# Patient Record
Sex: FEMALE | Race: BLACK OR AFRICAN AMERICAN | NOT HISPANIC OR LATINO | Employment: UNEMPLOYED | URBAN - METROPOLITAN AREA
[De-identification: names, ages, dates, MRNs, and addresses within clinical notes are randomized per-mention and may not be internally consistent; named-entity substitution may affect disease eponyms.]

---

## 2024-11-22 DIAGNOSIS — R82.5 POSITIVE URINE DRUG SCREEN: ICD-10-CM

## 2024-11-22 DIAGNOSIS — O16.1 ELEVATED BLOOD PRESSURE AFFECTING PREGNANCY IN FIRST TRIMESTER, ANTEPARTUM: Primary | ICD-10-CM

## 2024-12-01 PROBLEM — O16.9 ELEVATED BLOOD PRESSURE AFFECTING PREGNANCY, ANTEPARTUM: Status: ACTIVE | Noted: 2024-12-01

## 2024-12-01 PROBLEM — E66.01 SEVERE OBESITY DUE TO EXCESS CALORIES AFFECTING PREGNANCY IN FIRST TRIMESTER: Status: ACTIVE | Noted: 2024-12-01

## 2024-12-01 PROBLEM — O99.320 MARIJUANA USE DURING PREGNANCY: Status: ACTIVE | Noted: 2024-12-01

## 2024-12-01 PROBLEM — F12.90 MARIJUANA USE DURING PREGNANCY: Status: ACTIVE | Noted: 2024-12-01

## 2024-12-01 PROBLEM — O99.211 SEVERE OBESITY DUE TO EXCESS CALORIES AFFECTING PREGNANCY IN FIRST TRIMESTER: Status: ACTIVE | Noted: 2024-12-01

## 2024-12-01 NOTE — PROGRESS NOTES
Maternal Fetal Medicine New Consult    Subjective:     Patient ID: [id number]    Chief Complaint: MFM Consult with US (Elevated Blood Pressure, BMI 44, + UDS Cannabinoids./)      HPI: 29 y.o.  female  at 12w4d gestation with Estimated Date of Delivery: 25. by LMP, consistent with early US. She is sent for MFM consultation for elevated BP, elevated BMI, positive UDS.     She had elevated BP reading of 138/94 at her initial OB visit on 24. She denies any known history of hypertension. She had elevated BMI of 44 at initial OB visit. Her UDS on 11/15/24 returned positive of cannabinoids. She reports stopped with discovery of pregnancy.        She denies any personal or family history of aneuploidy or anomalies. She denies any exposure to high fevers, viral rashes, illicit drugs or alcohol in this pregnancy.  She denies any leaking fluid, vaginal bleeding, contractions, decreased fetal movement. Denies headaches, visual disturbances, or epigastric pain.       Pregnancy complications include:  Patient Active Problem List   Diagnosis    Severe obesity due to excess calories affecting pregnancy in first trimester    Pre-existing essential hypertension affecting pregnancy in first trimester    Marijuana use during pregnancy    Leiomyoma of uterus affecting pregnancy in first trimester       History reviewed. No pertinent past medical history.    Past Surgical History:   Procedure Laterality Date    APPENDECTOMY      at the age of 10       Family History   Problem Relation Name Age of Onset    No Known Problems Paternal Grandfather      No Known Problems Paternal Grandmother      No Known Problems Maternal Grandmother      No Known Problems Maternal Grandfather      No Known Problems Father      Hypertension Mother      No Known Problems Brother      Eclampsia Sister         Social History     Socioeconomic History    Marital status: Single   Tobacco Use    Smoking status: Never     Passive exposure: Never     "Smokeless tobacco: Never   Substance and Sexual Activity    Alcohol use: Not Currently    Drug use: Never    Sexual activity: Yes     Partners: Male       Current Outpatient Medications   Medication Sig Dispense Refill    prenatal vit/iron fum/folic ac (PRENATAL 1+1 ORAL) Take by mouth.      aspirin (ECOTRIN) 81 MG EC tablet Take 1 tablet (81 mg total) by mouth 2 (two) times a day. Start after 12 weeks gestation.  At 35 weeks gestation, decrease to one 81mg tablet daily until delivery. 60 tablet 10    labetaloL (NORMODYNE) 100 MG tablet Take 1 tablet (100 mg total) by mouth every 12 (twelve) hours. 60 tablet 4     No current facility-administered medications for this visit.       Review of patient's allergies indicates:  No Known Allergies     Review of Systems   12 point review of systems conducted, negative except as stated in the history of present illness. See HPI for details.      Objective:     Visit Vitals  BP (!) 140/100 (BP Location: Right arm, Patient Position: Sitting)   Pulse 76   Ht 5' 5" (1.651 m)   Wt 123.4 kg (272 lb)   BMI 45.26 kg/m²        Physical Exam  Vitals and nursing note reviewed.   Constitutional:       General: She is not in acute distress.     Appearance: Normal appearance.      Comments: Increased BMI   HENT:      Head: Normocephalic and atraumatic.      Nose: Nose normal. No congestion.      Mouth/Throat:      Pharynx: Oropharynx is clear.   Eyes:      General: No scleral icterus.     Conjunctiva/sclera: Conjunctivae normal.   Cardiovascular:      Rate and Rhythm: Normal rate and regular rhythm.   Pulmonary:      Effort: No respiratory distress.      Breath sounds: Normal breath sounds. No wheezing.   Abdominal:      General: Abdomen is flat.      Palpations: Abdomen is soft.      Tenderness: There is no abdominal tenderness. There is no right CVA tenderness, left CVA tenderness or guarding.      Comments: No CVA tenderness gravid uterus.    Musculoskeletal:         General: Normal " range of motion.      Cervical back: Neck supple.      Right lower leg: No edema.      Left lower leg: No edema.   Skin:     General: Skin is warm.      Findings: No bruising or rash.   Neurological:      General: No focal deficit present.      Mental Status: She is oriented to person, place, and time.      Deep Tendon Reflexes: Reflexes normal.      Comments: Normal reflexes   Psychiatric:         Mood and Affect: Mood normal.         Behavior: Behavior normal.         Thought Content: Thought content normal.         Judgment: Judgment normal.         Assessment/Plan:     29 y.o.  female with IUP at 12w4d    Chronic hypertension  Viable IUP with measurements consistent with established dating on 12/3/2024.      With 2nd elevated blood pressure reading at this gestational age, chronic hypertension is confirmed.    Chronic hypertension complicates up to 2-5% of pregnancies and is associated with significant adverse pregnancy outcomes including  birth, fetal growth restriction, fetal demise, placental abruption, and  delivery. Recent data suggest higher risk of certain congenital anomalies, including, heart defects, hypospadias and esophageal atresia. The incidence of adverse outcomes seen in pregnancies complicated by chronic hypertension is related to the degree and duration of hypertension and to the association of other organ system involvement or damage. Most pregnant women with mild chronic hypertension have uneventful pregnancies with no end-organ involvement. Comparing women who developed superimposed preeclampsia with women who did not, the incidence of  birth was 100% versus 38%, the incidence of small-for-gestational-age (SGA) infants was 78% versus 15%, and the  mortality rate was 48% versus 0%. Besides superimposed preeclampsia or eclampsia, pregnancy complicated by chronic hypertension (especially if severe) may be associated with worsening or malignant hypertension,  "central nervous system hemorrhage, cardiac decompensation, and renal deterioration or failure.    The age of onset, results of previous evaluation, severity and duration of hypertension, and physical examination are important determinants of which clinical tests may be useful. Ideally, a woman with chronic hypertension should be evaluated before pregnancy to ascertain potentially reversible causes and end-organ involvement (eg, heart or kidney). Baseline laboratory tests may include serum creatinine, blood urea nitrogen, electrolytes, CBC and 24-hour urine evaluation for total protein and creatinine clearance. Women who have had hypertension for several years are more likely to have cardiomegaly, ischemic heart disease, renal involvement, and retinopathy. In patients with severe disease over 4 years, or long standing hypertension (typically if over 30 years old), tests which may prove useful in the evaluation of these women include electrocardiography, echocardiography, ophthalmologic examination, and renal ultrasonography.     Women with paroxysmal hypertension, frequent "hypertensive crisis," seizure disorders, or anxiety attacks should be evaluated for pheochromocytoma with measurements of 24-hour urine vanillylmandelic acid, metanephrines, or unconjugated catecholamines. Magnetic resonance imaging after the first trimester or computed tomography may be useful for adrenal tumor localization. Renal artery stenosis appears to be more prevalent in patients with type-2 diabetes and coexistent hypertension. Doppler flow studies or magnetic resonance angiography are helpful to detect renal artery stenosis. Negative results from renal ultrasonography do not exclude renal artery stenosis.     In women with chronic hypertension, it can be difficult to discern worsening hypertension that might occur as a result of physiological changes of pregnancy in the third trimester from the development of preeclampsia. The use of " certain laboratory tests such as serum creatinine, liver functions tests, hematocrit and platelets to compare to baseline values from early in pregnancy might serve to delineate these conditions. Routine screening of women with chronic hypertension with these laboratory tests is not routinely indicated.    I have shared with her the normal natural course of chronic hypertension in pregnancy with improvement early on and likely increasing blood pressure as the pregnancy advances. Based on findings of recent CHAP Study, it is recommended to utilize 140/90 as the threshold for initiation or titration of medical therapy for chronic hypertension in pregnancy, rather than the previously recommended threshold of 160/110. For patients on blood pressure medications at the start of pregnancy, in the absence of mitigating factors or side effects, they can be maintained on their medications, rather than discontinuing them and waiting to initiate treatment for blood pressures in the severe range. Commonly used medications include nifedipine and labetalol.    Vitals:    24 0910 24 0914 24 0936   BP: (!) 149/110 (!) 147/101 (!) 140/100    With this blood pressure, she was advised to follow low salt diet, and start labetalol at 100 mg q.12 hours. She is also to follow a low sodium diet avoiding any excessive weight gain.     Use aspirin as discussed.    She was advised of the higher risk of fetal growth restriction and superimposed preeclampsia with her underlying chronic hypertension. The risk of placental abruption was also discussed. No prevention is available with her reporting any bleeding or cramping. She was also advised to report any headaches, visual problems, epigastric pain.    There is no consensus as to the most appropriate fetal surveillance test(s) or the interval and timing of testing in  with chronic hypertension. Thus, such testing should be individualized and based on clinical judgment  and on severity of disease.    We will plan to do a level 2 scan around 20 weeks understanding the limitations of ultrasound in checking anomalies and follow-up ultrasounds to monitor growth around 24-26 weeks and then every 4 weeks until the end of pregnancy. Recommend fetal testing starting around 32 weeks gestation until the end of pregnancy    With chronic hypertension on medication, BMI over 40,  recommend delivery at 38 weeks' gestation (38-38 6/7th weeks) as optimal balance between prematurity risks and risks of continued pregnancy. Earlier delivery may be needed for worsening maternal or fetal status.      Recommend close followup after delivery with Primary OB as well as PCP for ongoing evaluation/treatment for HTN.        Elevated BMI  Body mass index is 45.26 kg/m².    I discussed with her the risk of first trimester miscarriage, recurrent miscarriages, congenital anomalies, hypertensive disorders, gestational diabetes,  delivery (BMI>35), macrosomia, higher risk of fetal demise in-utero and higher risk of . She was advised of the importance of eating healthy and and limiting weight gain to 11-15 lbs during the pregnancy, as optimal in this situation.  I recommended low calorie, low fat diet avoiding any additional excessive weight gain.  Excess weight gain would be associated with gestational hypertension, gestational diabetes and adverse  outcomes, including fetal demise in utero.    Low dose aspirin as discussed.    Due to the lack of consistent evidence to indicate  and maternal benefit from early diagnosis and treatment of gestational diabetes mellitus (GDM), the American College of Obstetricians and Gynecologists (ACOG) does not recommend routine screening for GDM before 24 weeks of gestation. ACOG continues to recommend screening for pregestational diabetes mellitus before 24 weeks of gestation, preferably at the onset of prenatal care, in patients with risk  factors.    With her increased risk for pregestational diabetes mellitus, recommend screening for pregestational diabetes with hemoglobin A1c, order given. and I recommend 1 hour GCT between 24-28 weeks' gestation.    With elevated BMI, start fetal testing starting around 34 weeks and do  level 2 scan around 20 weeks. She was advised to do fetal kick counts 3 times daily and PRN after 24 weeks, with immediate reporting of decreased fetal movements (<10 movements/hr).    Preoperative antibiotics and thromboprophylaxis with use of pneumatic compression devices is recommend in those with very elevated BMI undergoing  delivery. Thromboprophylaxis should also be used with those on prolonged immobilization.    Discussed the importance of losing weight after this pregnancy, especially before attempting future pregnancy. Breastfeeding may be an important tool in reducing the postpartum weight retention. Fetal risks were discussed with short term risk of fetal/ obesity and long term risk of adolescent component of metabolic syndrome.       Marijuana use in pregnancy  I discussed with her the likely cognitive developmental effect of those drugs that would not be visible by the ultrasound and the importance of long term  follow up.  Marijuana use in pregnancy is associated with long term neurobehavioral adverse outcomes, as well as fetal growth restriction, stillbirth, spontaneous  birth, and  intensive care unit admission. .  I stressed the importance of complete abstinence from drug use in pregnancy. I would recommend doing intermittent drug screens to help with compliance, with risks and benefits of drug testing discussed and with ultrasounds to check fetal growth (with continued drug use).         Leiomyoma in pregnancy  2 uterine fibroids are noted, largest 4.2 cm on 12/3/2024.    I counseled the patient regarding fibroids in pregnancy. She was counseled on the risk for   delivery,  PROM, abnormal presentations, higher risk of  sections, placenta previa, fibroid degeneration, poor fetal growth, oligohydramnios, vaginal bleeding and postpartum hemorrhage and in rare circumstances obstruction of lower uterus/cervix, impeding vaginal delivery. Although it was previously suggested that myomas are associated with spontaneous miscarriages, a recent review from 2017, showed no increased risk of spontaneous miscarriages in an analysis of 20,000 pregnant women with fibroids.Recent data shows potential association with increase stillbirth risk. The size of myomas, number and position behind placenta may be important factors in that risk. There is no prevention available and expectant management needs to be done.     Myomectomy is generally avoided in pregnancy due to concerns regarding pregnancy complications.    If concern for degenerating fibroids, can administer short course of NSAID (48-72hrs of Indocin) prior to 32 weeks gestation.     She was instructed to report any increased pain, cramps, vaginal discharge or bleeding. Emphasized the importance of monitoring fetal kick count activity, and reporting immediately if decreased fetal movement occurs.    Document postpartum hemorrhage risk on admission to hospital, ensure T&S sent, and consider type and crossmatch depending on postpartum hemorrhage risk.      Preeclampsia prophylaxis  With her risk factors for preeclampsia including chronic hypertension , nulliparity, BMI over 30, mother with preeclampsia, sister with preeclampsia,  ancestry, and low socioeconomic status, discussed recommendations for low dose aspirin use to decrease risks for adverse outcomes, including preeclampsia, low birth weight and  delivery. Low-dose aspirin reduced the risk for preeclampsia by 15% in clinical trials and reduced the risk for  birth by 20% and FGR by 20%, and  mortality by around 20%.  After discussing  risks/benefits of its use, it was agreed to start asa 81 mg BID, due to multiple risk factors for preeclampsia. After discussing benefits (more effective than daily) vs potential risks (less data on safety with use at delivery), she agreed to start low dose aspirin twice daily and continue until 34 6/7weeks, then decrease to once daily until delivery.. Also, recommend using in all future pregnancies.    Patient was counseled of the management of chronic hypertension.  With a uncontrolled hypertension at this time, importance of low-salt healthy diet with limiting weight gain as well as started patient on labetalol 100 mg q.12h.  After counseling on aspirin b.i.d. versus daily agreed to use aspirin b.i.d. starting now till around 34 weeks' gestation then decrease to daily after that.  Patient was counseled of the fibroids and expectant management is to be done for that.    Follow up in about 8 weeks (around 1/28/2025) for MFM follow-up level 2 scan (1 week MFM follow-up visit only).     No future appointments.     Patient was evaluated by JONA Santoyo and Dr. Garner.  Final assessment and recommendations as stated above were made by Dr. Garner.    This note was created with the assistance of [employer] voice recognition software. There may be transcription errors as a result of using this technology, however minimal. Effort has been made to ensure accuracy of transcription, but any obvious errors or omissions should be clarified with the author of the document.

## 2024-12-02 ENCOUNTER — TELEPHONE (OUTPATIENT)
Dept: MATERNAL FETAL MEDICINE | Facility: CLINIC | Age: 29
End: 2024-12-02
Payer: MEDICAID

## 2024-12-03 ENCOUNTER — PROCEDURE VISIT (OUTPATIENT)
Dept: MATERNAL FETAL MEDICINE | Facility: CLINIC | Age: 29
End: 2024-12-03
Payer: MEDICAID

## 2024-12-03 ENCOUNTER — OFFICE VISIT (OUTPATIENT)
Dept: MATERNAL FETAL MEDICINE | Facility: CLINIC | Age: 29
End: 2024-12-03
Payer: MEDICAID

## 2024-12-03 VITALS
HEART RATE: 76 BPM | DIASTOLIC BLOOD PRESSURE: 100 MMHG | BODY MASS INDEX: 45.32 KG/M2 | HEIGHT: 65 IN | SYSTOLIC BLOOD PRESSURE: 140 MMHG | WEIGHT: 272 LBS

## 2024-12-03 DIAGNOSIS — R82.5 POSITIVE URINE DRUG SCREEN: ICD-10-CM

## 2024-12-03 DIAGNOSIS — F12.90 MARIJUANA USE DURING PREGNANCY: ICD-10-CM

## 2024-12-03 DIAGNOSIS — O16.1 ELEVATED BLOOD PRESSURE AFFECTING PREGNANCY IN FIRST TRIMESTER, ANTEPARTUM: ICD-10-CM

## 2024-12-03 DIAGNOSIS — O10.011 PRE-EXISTING ESSENTIAL HYPERTENSION AFFECTING PREGNANCY IN FIRST TRIMESTER: ICD-10-CM

## 2024-12-03 DIAGNOSIS — O99.320 MARIJUANA USE DURING PREGNANCY: ICD-10-CM

## 2024-12-03 DIAGNOSIS — D25.9 LEIOMYOMA OF UTERUS AFFECTING PREGNANCY IN FIRST TRIMESTER: ICD-10-CM

## 2024-12-03 DIAGNOSIS — E66.01 SEVERE OBESITY DUE TO EXCESS CALORIES AFFECTING PREGNANCY IN FIRST TRIMESTER: Primary | ICD-10-CM

## 2024-12-03 DIAGNOSIS — O34.11 LEIOMYOMA OF UTERUS AFFECTING PREGNANCY IN FIRST TRIMESTER: ICD-10-CM

## 2024-12-03 DIAGNOSIS — O99.211 SEVERE OBESITY DUE TO EXCESS CALORIES AFFECTING PREGNANCY IN FIRST TRIMESTER: Primary | ICD-10-CM

## 2024-12-03 RX ORDER — ASPIRIN 81 MG/1
81 TABLET ORAL 2 TIMES DAILY
Qty: 60 TABLET | Refills: 10 | Status: SHIPPED | OUTPATIENT
Start: 2024-12-03 | End: 2025-12-03

## 2024-12-03 RX ORDER — LABETALOL 100 MG/1
100 TABLET, FILM COATED ORAL EVERY 12 HOURS
Qty: 60 TABLET | Refills: 4 | Status: SHIPPED | OUTPATIENT
Start: 2024-12-03

## 2024-12-06 NOTE — PROGRESS NOTES
"  Maternal Fetal Medicine Follow Up    Subjective:     Patient ID: [id number]    Chief Complaint: No chief complaint on file.      HPI: Denise Ulloa is a 29 y.o. female  at 13w4d gestation with Estimated Date of Delivery: 25  who is here for follow-up consultation by M.    She has chronic hypertension diagnosed early this pregnancy.  She is on labetalol 100 mg q.12 hours (started 2024).  She is on low-dose aspirin BID.  She had elevated BMI of 44 at initial OB visit. Her UDS on 11/15/24 returned positive of cannabinoids. She reports stopped with discovery of pregnancy.  She had uterine fibroids seen on consult ultrasound.    Patient is here for blood pressure check today.       Interval history since last M visit: None.. She denies any leaking fluid, vaginal bleeding, contractions, decreased fetal movement. Denies headaches, visual disturbances, or epigastric pain.    Pregnancy complications include:   Patient Active Problem List   Diagnosis    BMI over 40 affecting pregnancy in first trimester    Pre-existing essential hypertension affecting pregnancy in first trimester    Marijuana use during pregnancy    Leiomyoma of uterus affecting pregnancy in first trimester       No changes to medical, surgical, family, social, or obstetric history.    Medications:  Current Outpatient Medications   Medication Instructions    aspirin (ECOTRIN) 81 mg, Oral, 2 times daily, Start after 12 weeks gestation.  At 35 weeks gestation, decrease to one 81mg tablet daily until delivery.    labetaloL (NORMODYNE) 100 mg, Oral, Every 12 hours    prenatal vit/iron fum/folic ac (PRENATAL 1+1 ORAL) Take by mouth.       Review of Systems   12 point review of systems conducted, negative except as stated in the history of present illness. See HPI for details.      Objective:     Visit Vitals  BP (!) 150/100 (BP Location: Left arm, Patient Position: Sitting)   Pulse 93   Ht 5' 5" (1.651 m)   Wt 121.6 kg (268 lb)   BMI 44.60 " kg/m²        Physical Exam  Vitals and nursing note reviewed.   Constitutional:       Appearance: Normal appearance.      Comments: Increased BMI   HENT:      Head: Normocephalic and atraumatic.      Nose: Nose normal. No congestion.   Cardiovascular:      Rate and Rhythm: Normal rate.   Pulmonary:      Effort: Pulmonary effort is normal.   Skin:     Findings: No rash.   Neurological:      Mental Status: She is alert and oriented to person, place, and time.   Psychiatric:         Mood and Affect: Mood normal.         Behavior: Behavior normal.         Thought Content: Thought content normal.         Judgment: Judgment normal.         Assessment/Plan:     29 y.o.  female with IUP at 13w4d    Chronic hypertension  Viable IUP with measurements consistent with established dating on 12/3/2024.    FHT present per Doppler today at 163 (12/10/2024).     Chronic hypertension is associated with significant adverse pregnancy outcomes including  birth, fetal growth restriction, fetal demise, placental abruption, and  delivery. Based on findings of recent CHAP Study, it is recommended to utilize 140/90 as the threshold for initiation or titration of medical therapy for chronic hypertension in pregnancy, rather than the previously recommended threshold of 160/110. For patients on blood pressure medications at the start of pregnancy, in the absence of mitigating factors or side effects, they can be maintained on their medications, rather than discontinuing them and waiting to initiate treatment for blood pressures in the severe range.    Vitals:    12/10/24 0913 12/10/24 0917   BP: (!) 147/107 (!) 150/100    With this BP, she was advised to continue low salt diet, and adjust labetalol to 200 mg every 12 hours .   Reminded to do labs as ordered.     Low dose aspirin as discussed.    She would benefit from a level 2 scan around 20 weeks understanding the limitations of ultrasound in checking anomalies and  follow-up ultrasounds to monitor growth around 24-26 weeks and then every 4 weeks until the end of pregnancy. Recommend fetal testing starting around 32 weeks gestation until the end of pregnancy.    Among patients with uncomplicated chronic hypertension with no additional risk factors, delivery at 38-39 weeks appears to provide optimal balance between the risk of adverse fetal and  outcomes. However, will reassess closer to EDC to determine optimal timeframe or GA for delivery, based on current evaluation at that time.     Recommend close postpartum followup with Primary OB as well as PCP for ongoing evaluation/treatment of HTN.        Elevated BMI  Body mass index is 44.6 kg/m². With some weight loss, she was advised to continue healthy, low-sodium diet with a goal of 11-20 lb gain in the pregnancy. Excess weight gain would be associated with gestational hypertension, gestational diabetes and adverse  outcomes, including fetal demise in utero.    Encouraged to do hemoglobin A1c as ordered on consult visit.  If normal,  I recommend 1 hour GCT between 24-28 weeks' gestation.    Fetal surveillance as above.    Preoperative antibiotics and thromboprophylaxis with use of pneumatic compression devices is recommend in those with very elevated BMI undergoing  delivery. Thromboprophylaxis should also be used with those on prolonged immobilization.    It is important to lose weight after the pregnancy is over, especially before a future pregnancy was discussed. Breastfeeding may be an important tool in reducing the postpartum weight retention. Fetal risks were discussed with short term risk of fetal/ obesity and long term risk of adolescent component of metabolic syndrome.        Previous marijuana use  Previously discussed risks.  She reported abstinence and she was encouraged to continue complete abstinence throughout the pregnancy.        Leiomyoma in pregnancy  2 uterine fibroids are  noted, largest 4.2 cm on 12/3/2024.    Risks of fibroids include  delivery,  PROM, abnormal presentations, higher risk of  sections, placenta previa, fibroid degeneration, poor fetal growth, oligohydramnios, vaginal bleeding and postpartum hemorrhage and in rare circumstances obstruction of lower uterus/cervix, impeding vaginal delivery    We will follow fetal growth as above.  If concern for degenerating fibroids, can administer short course of NSAID (48-72hrs of Indocin) prior to 32 weeks gestation.     Reviewed instructions to report any increased pain, cramps, vaginal discharge or bleeding. Emphasized the importance of monitoring fetal kick count activity, and reporting immediately if decreased fetal movement occurs.    Document postpartum hemorrhage risk on admission to hospital, ensure T&S sent, and consider type and crossmatch depending on postpartum hemorrhage risk       Preeclampsia prophylaxis  She was advised to continue asa 81 mg BID until 34 6/7 weeks, then decrease to once daily until delivery. Preeclampsia precautions reviewed.     Blood pressure is still uncontrolled patient has no symptoms.  Patient was advised to double her dose of medication to labetalol 200 mg q.12 hours and she has an appointment in 1 week with Dr. Greenberg that she was advised to keep.  Patient was also advised to have her blood pressure checked at home and to report Dr. Greenberg or us if blood pressures systolic over 155 or diastolic over 105.  She will continue close follow-up with Dr. Greenberg over the next 6 weeks with the 1st follow-up next week and will see her back in January.  Reminded the patient of the importance of continuing the aspirin b.i.d., low-salt diet, and doing the 24 hour urine in the hemoglobin A1c that she still had the order for.  No follow-ups on file.     Future Appointments   Date Time Provider Department Center   2025  8:15 AM ROOM 3, [facility] [place]    1/28/2025  8:30 AM Mo Garner MD [facility] [place]        ADDY involvement: Patient was evaluated and examined by Dr. Garner. JONA Santoyo, helped in pre charting of part of note.    This note was created with the assistance of [employer] voice recognition software. There may be transcription errors as a result of using this technology, however minimal. Effort has been made to ensure accuracy of transcription, but any obvious errors or omissions should be clarified with the author of the document.

## 2024-12-10 ENCOUNTER — OFFICE VISIT (OUTPATIENT)
Dept: MATERNAL FETAL MEDICINE | Facility: CLINIC | Age: 29
End: 2024-12-10
Payer: MEDICAID

## 2024-12-10 VITALS
HEIGHT: 65 IN | BODY MASS INDEX: 44.65 KG/M2 | HEART RATE: 93 BPM | DIASTOLIC BLOOD PRESSURE: 100 MMHG | WEIGHT: 268 LBS | SYSTOLIC BLOOD PRESSURE: 150 MMHG

## 2024-12-10 DIAGNOSIS — E66.01 SEVERE OBESITY DUE TO EXCESS CALORIES AFFECTING PREGNANCY IN FIRST TRIMESTER: ICD-10-CM

## 2024-12-10 DIAGNOSIS — O99.211 SEVERE OBESITY DUE TO EXCESS CALORIES AFFECTING PREGNANCY IN FIRST TRIMESTER: ICD-10-CM

## 2024-12-10 DIAGNOSIS — D25.9 LEIOMYOMA OF UTERUS AFFECTING PREGNANCY IN FIRST TRIMESTER: ICD-10-CM

## 2024-12-10 DIAGNOSIS — O34.11 LEIOMYOMA OF UTERUS AFFECTING PREGNANCY IN FIRST TRIMESTER: ICD-10-CM

## 2024-12-10 DIAGNOSIS — F12.90 MARIJUANA USE DURING PREGNANCY: ICD-10-CM

## 2024-12-10 DIAGNOSIS — O99.320 MARIJUANA USE DURING PREGNANCY: ICD-10-CM

## 2024-12-10 DIAGNOSIS — O10.011 PRE-EXISTING ESSENTIAL HYPERTENSION AFFECTING PREGNANCY IN FIRST TRIMESTER: Primary | ICD-10-CM

## 2024-12-10 PROCEDURE — 3077F SYST BP >= 140 MM HG: CPT | Mod: CPTII,S$GLB,, | Performed by: OBSTETRICS & GYNECOLOGY

## 2024-12-10 PROCEDURE — 1159F MED LIST DOCD IN RCRD: CPT | Mod: CPTII,S$GLB,, | Performed by: OBSTETRICS & GYNECOLOGY

## 2024-12-10 PROCEDURE — 1160F RVW MEDS BY RX/DR IN RCRD: CPT | Mod: CPTII,S$GLB,, | Performed by: OBSTETRICS & GYNECOLOGY

## 2024-12-10 PROCEDURE — 99214 OFFICE O/P EST MOD 30 MIN: CPT | Mod: TH,S$GLB,, | Performed by: OBSTETRICS & GYNECOLOGY

## 2024-12-10 PROCEDURE — 3080F DIAST BP >= 90 MM HG: CPT | Mod: CPTII,S$GLB,, | Performed by: OBSTETRICS & GYNECOLOGY

## 2024-12-10 PROCEDURE — 3008F BODY MASS INDEX DOCD: CPT | Mod: CPTII,S$GLB,, | Performed by: OBSTETRICS & GYNECOLOGY

## 2024-12-10 RX ORDER — LABETALOL 200 MG/1
200 TABLET, FILM COATED ORAL EVERY 12 HOURS
Qty: 60 TABLET | Refills: 5 | Status: SHIPPED | OUTPATIENT
Start: 2024-12-10 | End: 2025-06-08

## 2025-01-16 DIAGNOSIS — O10.011 PRE-EXISTING ESSENTIAL HYPERTENSION AFFECTING PREGNANCY IN FIRST TRIMESTER: Primary | ICD-10-CM

## 2025-01-16 DIAGNOSIS — O99.211 SEVERE OBESITY DUE TO EXCESS CALORIES AFFECTING PREGNANCY IN FIRST TRIMESTER: ICD-10-CM

## 2025-01-16 DIAGNOSIS — Z36.89 ENCOUNTER FOR FETAL ANATOMIC SURVEY: ICD-10-CM

## 2025-01-16 DIAGNOSIS — E66.01 SEVERE OBESITY DUE TO EXCESS CALORIES AFFECTING PREGNANCY IN FIRST TRIMESTER: ICD-10-CM

## 2025-01-16 DIAGNOSIS — D25.9 LEIOMYOMA OF UTERUS AFFECTING PREGNANCY IN FIRST TRIMESTER: ICD-10-CM

## 2025-01-16 DIAGNOSIS — O34.11 LEIOMYOMA OF UTERUS AFFECTING PREGNANCY IN FIRST TRIMESTER: ICD-10-CM

## 2025-01-24 PROBLEM — O10.012 PRE-EXISTING ESSENTIAL HYPERTENSION AFFECTING PREGNANCY IN SECOND TRIMESTER: Status: ACTIVE | Noted: 2024-12-01

## 2025-01-24 PROBLEM — O99.212 SEVERE OBESITY DUE TO EXCESS CALORIES AFFECTING PREGNANCY IN SECOND TRIMESTER: Status: ACTIVE | Noted: 2024-12-01

## 2025-01-24 PROBLEM — O34.12 LEIOMYOMA OF UTERUS AFFECTING PREGNANCY IN SECOND TRIMESTER: Status: ACTIVE | Noted: 2024-12-03

## 2025-01-24 NOTE — PROGRESS NOTES
Maternal Fetal Medicine Follow Up    Subjective:     Patient ID: [id number]    Chief Complaint: MFM Followup      HPI: Denise Ulloa is a 29 y.o. female  at 20w4d gestation with Estimated Date of Delivery: 25  who is here for follow-up consultation by M.    She has chronic hypertension diagnosed early this pregnancy.  She is on labetalol 300 mg q.12 hours.  Baseline labs on 2024:  Serum creatinine 0.61, 24 hour urine 203.7 mg protein.  She is on low-dose aspirin BID.  She had elevated BMI of 44 at initial OB visit.  On 2024, she had hemoglobin A1c 5.2%.  Her UDS on 11/15/24 returned positive of cannabinoids. She reported she stopped with discovery of pregnancy.  She had uterine fibroids seen on consult ultrasound.  Patient was accompanied by DJ her significant other.       Interval history since last [facility] visit: None.. She denies any leaking fluid, vaginal bleeding, contractions, decreased fetal movement. Denies headaches, visual disturbances, or epigastric pain.    Pregnancy complications include:   Patient Active Problem List   Diagnosis    BMI>40 affecting pregnancy in second trimester    Pre-existing essential hypertension affecting pregnancy in second trimester    Marijuana use during pregnancy    Leiomyoma of uterus affecting pregnancy in second trimester    Excessive weight gain in pregnancy in second trimester       No changes to medical, surgical, family, social, or obstetric history.    Medications:  Current Outpatient Medications   Medication Instructions    aspirin (ECOTRIN) 81 mg, Oral, 2 times daily, Start after 12 weeks gestation.  At 35 weeks gestation, decrease to one 81mg tablet daily until delivery.    labetaloL (NORMODYNE) 300 mg, 3 times daily    prenatal vit/iron fum/folic ac (PRENATAL 1+1 ORAL) Take by mouth.       Review of Systems   12 point review of systems conducted, negative except as stated in the history of present illness. See HPI for details.      Objective:  "    Visit Vitals  /88 (BP Location: Right arm, Patient Position: Sitting)   Pulse 77   Ht 5' 5" (1.651 m)   Wt 126.1 kg (277 lb 14.4 oz)   BMI 46.24 kg/m²          Physical Exam  Vitals and nursing note reviewed.   Constitutional:       Appearance: Normal appearance.      Comments: Increased BMI   HENT:      Head: Normocephalic and atraumatic.      Nose: Nose normal. No congestion.   Cardiovascular:      Rate and Rhythm: Normal rate.   Pulmonary:      Effort: Pulmonary effort is normal.   Skin:     Findings: No rash.   Neurological:      Mental Status: She is alert and oriented to person, place, and time.   Psychiatric:         Mood and Affect: Mood normal.         Behavior: Behavior normal.         Thought Content: Thought content normal.         Judgment: Judgment normal.         Assessment/Plan:     29 y.o.  female with IUP at 20w4d    Chronic hypertension  There is normal fetal growth and no anomalies seen on fetal anatomy scan on 25.  AFV is normal.     Chronic hypertension is associated with significant adverse pregnancy outcomes including  birth, fetal growth restriction, fetal demise, placental abruption, and  delivery. Based on findings of recent CHAP Study, it is recommended to utilize 140/90 as the threshold for initiation or titration of medical therapy for chronic hypertension in pregnancy, rather than the previously recommended threshold of 160/110. For patients on blood pressure medications at the start of pregnancy, in the absence of mitigating factors or side effects, they can be maintained on their medications, rather than discontinuing them and waiting to initiate treatment for blood pressures in the severe range.    Vitals:    25 0856 25 0858   BP: (!) 127/90 134/88      With this BP, she was advised to continue low salt diet, and continue labetalol 200 mg every 12 hours.     Low dose aspirin as discussed.    She would benefit from follow-up ultrasounds " to monitor growth every 4 weeks until the end of pregnancy. Recommend fetal testing starting around 32 weeks gestation until the end of pregnancy.    Among patients with uncomplicated chronic hypertension with no additional risk factors, delivery at 38-39 weeks appears to provide optimal balance between the risk of adverse fetal and  outcomes. However, will reassess closer to EDC to determine optimal timeframe or GA for delivery, based on current evaluation at that time.     Recommend close postpartum followup with Primary OB as well as PCP for ongoing evaluation/treatment of HTN.        Elevated BMI with 9 lb weight gain in the last 6 weeks  Body mass index is 46.24 kg/m². With some weight loss, earlier and 9 lb weight gain in the last 6 weeks.  she was advised to continue healthy, low-sodium diet with a goal of 11-20 lb gain in the pregnancy. Excess weight gain would be associated with gestational hypertension, gestational diabetes and adverse  outcomes, including fetal demise in utero.    I recommend 1 hour GCT between 24-28 weeks' gestation.    Fetal surveillance as above.    Preoperative antibiotics and thromboprophylaxis with use of pneumatic compression devices is recommend in those with very elevated BMI undergoing  delivery. Thromboprophylaxis should also be used with those on prolonged immobilization.    It is important to lose weight after the pregnancy is over, especially before a future pregnancy was discussed. Breastfeeding may be an important tool in reducing the postpartum weight retention. Fetal risks were discussed with short term risk of fetal/ obesity and long term risk of adolescent component of metabolic syndrome.        Previous marijuana use  Previously discussed risks.  She reported abstinence and she was encouraged to continue complete abstinence throughout the pregnancy.        Leiomyoma in pregnancy  2 uterine fibroids are noted, largest 5.7 cm on 25,  slightly larger than previous assessment.    Risks of fibroids include  delivery,  PROM, abnormal presentations, higher risk of  sections, placenta previa, fibroid degeneration, poor fetal growth, oligohydramnios, vaginal bleeding and postpartum hemorrhage and in rare circumstances obstruction of lower uterus/cervix, impeding vaginal delivery    We will follow fetal growth as above.  If concern for degenerating fibroids, can administer short course of NSAID (48-72hrs of Indocin) prior to 32 weeks gestation.     Reviewed instructions to report any increased pain, cramps, vaginal discharge or bleeding. Emphasized the importance of monitoring fetal kick count activity, and reporting immediately if decreased fetal movement occurs.    Document postpartum hemorrhage risk on admission to hospital, ensure T&S sent, and consider type and crossmatch depending on postpartum hemorrhage risk       Preeclampsia prophylaxis  She was advised to continue asa 81 mg BID until 34 6/7 weeks, then decrease to once daily until delivery. Preeclampsia precautions   reviewed.       Blood pressure is stable.  No adjustment done on dose of labetalol.  Continue aspirin b.i.d..  Importance of avoiding excess weight gain and decreasing caloric intake with a 9 lb weight gain in the last 6 weeks.  Fibroid is slightly larger then before.  Expectant management.  We will plan to see her back in 4 weeks.    Follow up in about 4 weeks (around 2025) for MFM follow-up, Repeat ultrasound.     Future Appointments   Date Time Provider Department Center   2025  9:00 AM Mo Garner MD [facility] [place]   2025  9:00 AM ROOM 2, [facility] M[place]      ADDY involvement: Patient was evaluated and examined by Dr. Garner. JONA Santoyo, helped in pre charting of part of note.    This note was created with the assistance of [employer] voice recognition software. There may be transcription errors as a result of  using this technology, however minimal. Effort has been made to ensure accuracy of transcription, but any obvious errors or omissions should be clarified with the author of the document.

## 2025-01-28 ENCOUNTER — OFFICE VISIT (OUTPATIENT)
Dept: MATERNAL FETAL MEDICINE | Facility: CLINIC | Age: 30
End: 2025-01-28
Payer: MEDICAID

## 2025-01-28 ENCOUNTER — PROCEDURE VISIT (OUTPATIENT)
Dept: MATERNAL FETAL MEDICINE | Facility: CLINIC | Age: 30
End: 2025-01-28
Payer: MEDICAID

## 2025-01-28 VITALS
HEART RATE: 77 BPM | BODY MASS INDEX: 46.3 KG/M2 | WEIGHT: 277.88 LBS | SYSTOLIC BLOOD PRESSURE: 134 MMHG | HEIGHT: 65 IN | DIASTOLIC BLOOD PRESSURE: 88 MMHG

## 2025-01-28 DIAGNOSIS — Z36.89 ENCOUNTER FOR FETAL ANATOMIC SURVEY: ICD-10-CM

## 2025-01-28 DIAGNOSIS — O34.12 LEIOMYOMA OF UTERUS AFFECTING PREGNANCY IN SECOND TRIMESTER: ICD-10-CM

## 2025-01-28 DIAGNOSIS — E66.01 SEVERE OBESITY DUE TO EXCESS CALORIES AFFECTING PREGNANCY IN FIRST TRIMESTER: ICD-10-CM

## 2025-01-28 DIAGNOSIS — O26.02 EXCESSIVE WEIGHT GAIN IN PREGNANCY IN SECOND TRIMESTER: ICD-10-CM

## 2025-01-28 DIAGNOSIS — O99.212 SEVERE OBESITY DUE TO EXCESS CALORIES AFFECTING PREGNANCY IN SECOND TRIMESTER: ICD-10-CM

## 2025-01-28 DIAGNOSIS — O99.211 SEVERE OBESITY DUE TO EXCESS CALORIES AFFECTING PREGNANCY IN FIRST TRIMESTER: ICD-10-CM

## 2025-01-28 DIAGNOSIS — D25.9 LEIOMYOMA OF UTERUS AFFECTING PREGNANCY IN FIRST TRIMESTER: ICD-10-CM

## 2025-01-28 DIAGNOSIS — O10.012 PRE-EXISTING ESSENTIAL HYPERTENSION AFFECTING PREGNANCY IN SECOND TRIMESTER: Primary | ICD-10-CM

## 2025-01-28 DIAGNOSIS — O34.11 LEIOMYOMA OF UTERUS AFFECTING PREGNANCY IN FIRST TRIMESTER: ICD-10-CM

## 2025-01-28 DIAGNOSIS — O10.011 PRE-EXISTING ESSENTIAL HYPERTENSION AFFECTING PREGNANCY IN FIRST TRIMESTER: ICD-10-CM

## 2025-01-28 DIAGNOSIS — D25.9 LEIOMYOMA OF UTERUS AFFECTING PREGNANCY IN SECOND TRIMESTER: ICD-10-CM

## 2025-01-28 DIAGNOSIS — E66.01 SEVERE OBESITY DUE TO EXCESS CALORIES AFFECTING PREGNANCY IN SECOND TRIMESTER: ICD-10-CM

## 2025-01-28 RX ORDER — LABETALOL 300 MG/1
300 TABLET, FILM COATED ORAL 3 TIMES DAILY
COMMUNITY
Start: 2024-12-30

## 2025-02-12 DIAGNOSIS — Z36.2 ENCOUNTER FOR FOLLOW-UP ULTRASOUND OF FETAL ANATOMY: ICD-10-CM

## 2025-02-12 DIAGNOSIS — O10.012 PRE-EXISTING ESSENTIAL HYPERTENSION AFFECTING PREGNANCY IN SECOND TRIMESTER: Primary | ICD-10-CM

## 2025-02-12 DIAGNOSIS — O99.212 SEVERE OBESITY DUE TO EXCESS CALORIES AFFECTING PREGNANCY IN SECOND TRIMESTER: ICD-10-CM

## 2025-02-12 DIAGNOSIS — O34.12 LEIOMYOMA OF UTERUS AFFECTING PREGNANCY IN SECOND TRIMESTER: ICD-10-CM

## 2025-02-12 DIAGNOSIS — E66.01 SEVERE OBESITY DUE TO EXCESS CALORIES AFFECTING PREGNANCY IN SECOND TRIMESTER: ICD-10-CM

## 2025-02-12 DIAGNOSIS — D25.9 LEIOMYOMA OF UTERUS AFFECTING PREGNANCY IN SECOND TRIMESTER: ICD-10-CM

## 2025-02-21 NOTE — PROGRESS NOTES
Maternal Fetal Medicine Follow Up    Subjective:     Patient ID: [id number]    Chief Complaint: [facility] follow up w/us       HPI: Denise Ulloa is a 29 y.o. female  at 24w4d gestation with Estimated Date of Delivery: 25  who is here for follow-up consultation by M.    She has chronic hypertension diagnosed early this pregnancy.  She is on labetalol 300 mg q.8 hours.  Baseline labs on 2024:  Serum creatinine 0.61, 24 hour urine 203.7 mg protein.  She is on low-dose aspirin BID.  She had elevated BMI of 44 at initial OB visit.  On 2024, she had hemoglobin A1c 5.2%.  Her UDS on 11/15/24 returned positive of cannabinoids. She reported she stopped with discovery of pregnancy.  She has fibroid uterus.       Interval history since last MFM visit: None.. She denies any leaking fluid, vaginal bleeding, contractions, decreased fetal movement. Denies headaches, visual disturbances, or epigastric pain.    Pregnancy complications include:   Patient Active Problem List   Diagnosis    BMI>40 affecting pregnancy in second trimester    Pre-existing essential hypertension affecting pregnancy in second trimester    Marijuana use during pregnancy    Leiomyoma of uterus affecting pregnancy in second trimester    Excessive weight gain in pregnancy in second trimester       No changes to medical, surgical, family, social, or obstetric history.    Medications:  Current Outpatient Medications   Medication Instructions    aspirin (ECOTRIN) 81 mg, Oral, 2 times daily, Start after 12 weeks gestation.  At 35 weeks gestation, decrease to one 81mg tablet daily until delivery.    labetaloL (NORMODYNE) 300 mg, 3 times daily    prenatal vit/iron fum/folic ac (PRENATAL 1+1 ORAL) Take by mouth.       Review of Systems   12 point review of systems conducted, negative except as stated in the history of present illness. See HPI for details.      Objective:     Visit Vitals  /89 (BP Location: Left arm, Patient Position:  "Sitting)   Pulse 84   Ht 5' 5" (1.651 m)   Wt 131.1 kg (289 lb)   BMI 48.09 kg/m²            Physical Exam  Vitals and nursing note reviewed.   Constitutional:       Appearance: Normal appearance.      Comments: Increased BMI   HENT:      Head: Normocephalic and atraumatic.      Nose: Nose normal. No congestion.   Cardiovascular:      Rate and Rhythm: Normal rate.   Pulmonary:      Effort: Pulmonary effort is normal.   Skin:     Findings: No rash.   Neurological:      Mental Status: She is alert and oriented to person, place, and time.   Psychiatric:         Mood and Affect: Mood normal.         Behavior: Behavior normal.         Thought Content: Thought content normal.         Judgment: Judgment normal.         Assessment/Plan:     29 y.o.  female with IUP at 24w4d    Chronic hypertension  There is normal fetal growth with an EFW of 711 g at the 44% and the AC at the 42% on 25.  AFV is normal.      Chronic hypertension is associated with significant adverse pregnancy outcomes including  birth, fetal growth restriction, fetal demise, placental abruption, and  delivery. Based on findings of recent CHAP Study, it is recommended to utilize 140/90 as the threshold for initiation or titration of medical therapy for chronic hypertension in pregnancy, rather than the previously recommended threshold of 160/110. For patients on blood pressure medications at the start of pregnancy, in the absence of mitigating factors or side effects, they can be maintained on their medications, rather than discontinuing them and waiting to initiate treatment for blood pressures in the severe range.    Vitals:    25 0925   BP: 133/89        With this BP, patient was advised to continue taking  labetalol 300 mg every 8  hours.     Low dose aspirin as discussed.    She would benefit from follow-up ultrasounds to monitor growth every 4 weeks until the end of pregnancy. Recommend fetal testing starting around 32 " weeks gestation until the end of pregnancy. Fetal kick count instructions reviewed.     Among patients with uncomplicated chronic hypertension with no additional risk factors, delivery at 38-39 weeks appears to provide optimal balance between the risk of adverse fetal and  outcomes. However, will reassess closer to EDC to determine optimal timeframe or GA for delivery, based on current evaluation at that time.     Recommend close postpartum followup with Primary OB as well as PCP for ongoing evaluation/treatment of HTN.        Elevated BMI continued excessive weight gain  Body mass index is 48.09 kg/m². With excessive gain of 12 lb in about 4 weeks, and 21 lb in about 10 weeks she was advised to decrease caloric intake and reviewed the importance of following healthy, low-sodium diet to avoid any additional excessive weight gain.   Excess weight gain would be associated with worsening hypertension, gestational diabetes and adverse  outcomes, including fetal demise in utero.    I recommend 1 hour GCT between 24-28 weeks' gestation.    Fetal surveillance as above.    Preoperative antibiotics and thromboprophylaxis with use of pneumatic compression devices is recommend in those with very elevated BMI undergoing  delivery. Thromboprophylaxis should also be used with those on prolonged immobilization.    It is important to lose weight after the pregnancy is over, especially before a future pregnancy was discussed. Breastfeeding may be an important tool in reducing the postpartum weight retention. Fetal risks were discussed with short term risk of fetal/ obesity and long term risk of adolescent component of metabolic syndrome.        Previous marijuana use  Previously discussed risks.  She reported abstinence and she was encouraged to continue complete abstinence throughout the pregnancy.        Leiomyoma in pregnancy  Largest mean 6.1 cm on today, 2025      Risks of fibroids include   delivery,  PROM, abnormal presentations, higher risk of  sections, placenta previa, fibroid degeneration, poor fetal growth, oligohydramnios, vaginal bleeding and postpartum hemorrhage and in rare circumstances obstruction of lower uterus/cervix, impeding vaginal delivery    We will follow fetal growth as above.  If concern for degenerating fibroids, can administer short course of NSAID (48-72hrs of Indocin) prior to 32 weeks gestation.     Reviewed instructions to report any increased pain, cramps, vaginal discharge or bleeding. Emphasized the importance of monitoring fetal kick count activity, and reporting immediately if decreased fetal movement occurs.    Document postpartum hemorrhage risk on admission to hospital, ensure T&S sent, and consider type and crossmatch depending on postpartum hemorrhage risk       Preeclampsia prophylaxis  She was advised to continue asa 81 mg BID until 34 6/7 weeks, then decrease to once daily until delivery. Preeclampsia precautions   reviewed.       There is normal fetal growth.  Blood pressure is stable.  Continue aspirin b.i.d. and labetalol 300 mg q.8 hours.  Risks from excessive weight gain reviewed advised her to decrease caloric intake.    Follow up in about 4 weeks (around 3/25/2025) for [facility] follow-up, Repeat ultrasound.     Future Appointments   Date Time Provider Department Center   3/25/2025  9:00 AM Mo Garner MD [facility] [place]   3/25/2025  9:00 AM ROOM 2, [facility] [place]        ADDY involvement: Patient was evaluated and examined by Dr. Garner. JONA Santoyo, helped in pre charting of part of note.    This note was created with the assistance of [employer] voice recognition software. There may be transcription errors as a result of using this technology, however minimal. Effort has been made to ensure accuracy of transcription, but any obvious errors or omissions should be clarified with the author of the document.

## 2025-02-25 ENCOUNTER — PROCEDURE VISIT (OUTPATIENT)
Dept: MATERNAL FETAL MEDICINE | Facility: CLINIC | Age: 30
End: 2025-02-25
Payer: MEDICAID

## 2025-02-25 ENCOUNTER — OFFICE VISIT (OUTPATIENT)
Dept: MATERNAL FETAL MEDICINE | Facility: CLINIC | Age: 30
End: 2025-02-25
Payer: MEDICAID

## 2025-02-25 VITALS
HEART RATE: 84 BPM | WEIGHT: 289 LBS | BODY MASS INDEX: 48.15 KG/M2 | HEIGHT: 65 IN | SYSTOLIC BLOOD PRESSURE: 133 MMHG | DIASTOLIC BLOOD PRESSURE: 89 MMHG

## 2025-02-25 DIAGNOSIS — O26.02 EXCESSIVE WEIGHT GAIN IN PREGNANCY IN SECOND TRIMESTER: ICD-10-CM

## 2025-02-25 DIAGNOSIS — Z36.2 ENCOUNTER FOR FOLLOW-UP ULTRASOUND OF FETAL ANATOMY: ICD-10-CM

## 2025-02-25 DIAGNOSIS — O99.212 SEVERE OBESITY DUE TO EXCESS CALORIES AFFECTING PREGNANCY IN SECOND TRIMESTER: ICD-10-CM

## 2025-02-25 DIAGNOSIS — O10.012 PRE-EXISTING ESSENTIAL HYPERTENSION AFFECTING PREGNANCY IN SECOND TRIMESTER: Primary | ICD-10-CM

## 2025-02-25 DIAGNOSIS — E66.01 SEVERE OBESITY DUE TO EXCESS CALORIES AFFECTING PREGNANCY IN SECOND TRIMESTER: ICD-10-CM

## 2025-02-25 DIAGNOSIS — O34.12 LEIOMYOMA OF UTERUS AFFECTING PREGNANCY IN SECOND TRIMESTER: ICD-10-CM

## 2025-02-25 DIAGNOSIS — D25.9 LEIOMYOMA OF UTERUS AFFECTING PREGNANCY IN SECOND TRIMESTER: ICD-10-CM

## 2025-02-25 DIAGNOSIS — O10.012 PRE-EXISTING ESSENTIAL HYPERTENSION AFFECTING PREGNANCY IN SECOND TRIMESTER: ICD-10-CM

## 2025-02-25 PROCEDURE — 3008F BODY MASS INDEX DOCD: CPT | Mod: CPTII,S$GLB,, | Performed by: OBSTETRICS & GYNECOLOGY

## 2025-02-25 PROCEDURE — 1160F RVW MEDS BY RX/DR IN RCRD: CPT | Mod: CPTII,S$GLB,, | Performed by: OBSTETRICS & GYNECOLOGY

## 2025-02-25 PROCEDURE — 1159F MED LIST DOCD IN RCRD: CPT | Mod: CPTII,S$GLB,, | Performed by: OBSTETRICS & GYNECOLOGY

## 2025-02-25 PROCEDURE — 99213 OFFICE O/P EST LOW 20 MIN: CPT | Mod: TH,S$GLB,, | Performed by: OBSTETRICS & GYNECOLOGY

## 2025-02-25 PROCEDURE — 3075F SYST BP GE 130 - 139MM HG: CPT | Mod: CPTII,S$GLB,, | Performed by: OBSTETRICS & GYNECOLOGY

## 2025-02-25 PROCEDURE — 76816 OB US FOLLOW-UP PER FETUS: CPT | Mod: S$GLB,,, | Performed by: OBSTETRICS & GYNECOLOGY

## 2025-02-25 PROCEDURE — 3079F DIAST BP 80-89 MM HG: CPT | Mod: CPTII,S$GLB,, | Performed by: OBSTETRICS & GYNECOLOGY

## 2025-03-12 DIAGNOSIS — Z36.89 ENCOUNTER FOR ULTRASOUND TO ASSESS FETAL GROWTH: ICD-10-CM

## 2025-03-12 DIAGNOSIS — O10.012 PRE-EXISTING ESSENTIAL HYPERTENSION AFFECTING PREGNANCY IN SECOND TRIMESTER: Primary | ICD-10-CM

## 2025-03-12 DIAGNOSIS — D25.9 LEIOMYOMA OF UTERUS AFFECTING PREGNANCY IN SECOND TRIMESTER: ICD-10-CM

## 2025-03-12 DIAGNOSIS — O34.12 LEIOMYOMA OF UTERUS AFFECTING PREGNANCY IN SECOND TRIMESTER: ICD-10-CM

## 2025-03-12 DIAGNOSIS — O99.212 SEVERE OBESITY DUE TO EXCESS CALORIES AFFECTING PREGNANCY IN SECOND TRIMESTER: ICD-10-CM

## 2025-03-12 DIAGNOSIS — E66.01 SEVERE OBESITY DUE TO EXCESS CALORIES AFFECTING PREGNANCY IN SECOND TRIMESTER: ICD-10-CM

## 2025-03-12 DIAGNOSIS — O26.02 EXCESSIVE WEIGHT GAIN IN PREGNANCY IN SECOND TRIMESTER: ICD-10-CM

## 2025-03-17 ENCOUNTER — TELEPHONE (OUTPATIENT)
Dept: MATERNAL FETAL MEDICINE | Facility: CLINIC | Age: 30
End: 2025-03-17
Payer: MEDICAID

## 2025-03-17 NOTE — TELEPHONE ENCOUNTER
----- Message from Med Assistant Mckeon sent at 3/17/2025  9:18 AM CDT -----  Regarding: Rhogam  Patient would like a call back at [phone] because her ob told her to contact us for her Rhogam Injection    Called her primary OB they ordered the Rhogam on 03/13/2025, and the infusion clinic should be calling her to set that up, called the pt to inform her of this matter no answer left vm x2     Sent Pt a message through the Pt portal.

## 2025-03-24 PROBLEM — O34.13 LEIOMYOMA OF UTERUS AFFECTING PREGNANCY IN THIRD TRIMESTER: Status: ACTIVE | Noted: 2024-12-03

## 2025-03-24 PROBLEM — O99.213 SEVERE OBESITY DUE TO EXCESS CALORIES AFFECTING PREGNANCY IN THIRD TRIMESTER: Status: ACTIVE | Noted: 2024-12-01

## 2025-03-24 PROBLEM — O10.013 PRE-EXISTING ESSENTIAL HYPERTENSION AFFECTING PREGNANCY IN THIRD TRIMESTER: Status: ACTIVE | Noted: 2024-12-01

## 2025-03-24 NOTE — PROGRESS NOTES
Maternal Fetal Medicine Follow Up    Subjective:     Patient ID: [id number]    Chief Complaint: M follow up with US (ADALBERTO.  )      HPI: Denise Ulloa is a 29 y.o. female  at 28w4d gestation with Estimated Date of Delivery: 25  who is here for follow-up consultation by M.    She has chronic hypertension diagnosed early this pregnancy.  She is on labetalol 300 mg every 8 hours.  Baseline labs on 2024:  Serum creatinine 0.61, 24 hour urine 203.7 mg protein.  She is on low-dose aspirin BID.  She had elevated BMI of 44 at initial OB visit.  On 2024, she had hemoglobin A1c 5.2%.  Her UDS on 11/15/24 returned positive of cannabinoids. She reported she stopped with discovery of pregnancy.  She has fibroid uterus.  Patient by accompanied by her mother Alice Vidal       Interval history since last M visit: None.. She denies any leaking fluid, vaginal bleeding, contractions, decreased fetal movement. Denies headaches, visual disturbances, or epigastric pain.    Pregnancy complications include:   Patient Active Problem List   Diagnosis    Increased BMI affecting pregnancy in third trimester    Pre-existing essential hypertension affecting pregnancy in third trimester    Marijuana use during pregnancy    Leiomyoma of uterus affecting pregnancy in third trimester    Excessive weight gain in pregnancy in second trimester       No changes to medical, surgical, family, social, or obstetric history.    Medications:  Current Outpatient Medications   Medication Instructions    aspirin (ECOTRIN) 81 mg, Oral, 2 times daily, Start after 12 weeks gestation.  At 35 weeks gestation, decrease to one 81mg tablet daily until delivery.    labetaloL (NORMODYNE) 300 mg, 3 times daily    prenatal vit/iron fum/folic ac (PRENATAL 1+1 ORAL) Take by mouth.       Review of Systems   12 point review of systems conducted, negative except as stated in the history of present illness. See HPI for details.      Objective:  "    Visit Vitals  /74 (BP Location: Right arm, Patient Position: Sitting)   Pulse 87   Ht 5' 5" (1.651 m)   Wt 129.7 kg (286 lb)   BMI 47.59 kg/m²       Physical Exam  Vitals and nursing note reviewed.   Constitutional:       Appearance: Normal appearance.      Comments: Increased BMI   HENT:      Head: Normocephalic and atraumatic.      Nose: Nose normal. No congestion.   Cardiovascular:      Rate and Rhythm: Normal rate.   Pulmonary:      Effort: Pulmonary effort is normal.   Skin:     Findings: No rash.   Neurological:      Mental Status: She is alert and oriented to person, place, and time.   Psychiatric:         Mood and Affect: Mood normal.         Behavior: Behavior normal.         Thought Content: Thought content normal.         Judgment: Judgment normal.         Assessment/Plan:     29 y.o.  female with IUP at 28w4d    Chronic hypertension  There is normal fetal growth with an EFW of 1209 g at the 38% and the AC at the 31% on 3/25/25.  AFV is normal.      Chronic hypertension is associated with significant adverse pregnancy outcomes including  birth, fetal growth restriction, fetal demise, placental abruption, and  delivery. Based on findings of recent CHAP Study, it is recommended to utilize 140/90 as the threshold for initiation or titration of medical therapy for chronic hypertension in pregnancy, rather than the previously recommended threshold of 160/110. For patients on blood pressure medications at the start of pregnancy, in the absence of mitigating factors or side effects, they can be maintained on their medications, rather than discontinuing them and waiting to initiate treatment for blood pressures in the severe range.    Vitals:    25 0927 25 0930   BP: 96/67 108/74          With this BP, labile with some low readings, she was advised to adjusted dose of labetalol to 300 mg every 12 hours.     Low dose aspirin as discussed.    She would benefit from follow-up " ultrasounds to monitor growth every 4 weeks until the end of pregnancy. Recommend fetal testing starting around 32 weeks gestation until the end of pregnancy. Fetal kick count instructions reviewed.     Among patients with uncomplicated chronic hypertension with no additional risk factors, delivery at 38-39 weeks appears to provide optimal balance between the risk of adverse fetal and  outcomes. However, will reassess closer to EDC to determine optimal timeframe or GA for delivery, based on current evaluation at that time.     Recommend close postpartum followup with Primary OB as well as PCP for ongoing evaluation/treatment of HTN.        Elevated BMI continued excessive weight gain  Body mass index is 47.59 kg/m². With relatively stable weight recently, she was advised to continue healthy, low-sodium diet avoiding any additional excessive weight gain. Excess weight gain would be associated with worsening hypertension, gestational diabetes and adverse  outcomes, including fetal demise in utero.    I recommend 1 hour GCT between 24-28 weeks' gestation.    Fetal surveillance as above.    Preoperative antibiotics and thromboprophylaxis with use of pneumatic compression devices is recommend in those with very elevated BMI undergoing  delivery. Thromboprophylaxis should also be used with those on prolonged immobilization.    It is important to lose weight after the pregnancy is over, especially before a future pregnancy was discussed. Breastfeeding may be an important tool in reducing the postpartum weight retention. Fetal risks were discussed with short term risk of fetal/ obesity and long term risk of adolescent component of metabolic syndrome.        Previous marijuana use  Previously discussed risks.  She was encouraged to continue complete abstinence throughout the pregnancy.  Consider intermittent drug screens throughout the pregnancy with risks/benefits of UDS discussed.         Leiomyoma in pregnancy  Largest mean 5.8cm today, 2025     Risks of fibroids include  delivery,  PROM, abnormal presentations, higher risk of  sections, placenta previa, fibroid degeneration, poor fetal growth, oligohydramnios, vaginal bleeding and postpartum hemorrhage and in rare circumstances obstruction of lower uterus/cervix, impeding vaginal delivery    We will follow fetal growth as above.  If concern for degenerating fibroids, can administer short course of NSAID (48-72hrs of Indocin) prior to 32 weeks gestation.     Reviewed instructions to report any increased pain, cramps, vaginal discharge or bleeding. Emphasized the importance of monitoring fetal kick count activity, and reporting immediately if decreased fetal movement occurs.    Document postpartum hemorrhage risk on admission to hospital, ensure T&S sent, and consider type and crossmatch depending on postpartum hemorrhage risk       Preeclampsia prophylaxis  She was advised to continue asa 81 mg BID until 34 6/7 weeks, then decrease to once daily until delivery. Preeclampsia precautions  reviewed.      I adjusted dose of labetalol to 300 q.12h.  Weight is relatively stable.  Fibroids are stable in size.    Follow up in about 3 weeks (around 4/15/2025) for MFM Followup, Repeat Ultrasound, BPP, Friday.     No future appointments.       ADDY involvement: Patient was evaluated and examined by Dr. Garner. JILLIAN Tyler, helped in pre charting of part of note.    This note was created with the assistance of [employer] voice recognition software. There may be transcription errors as a result of using this technology, however minimal. Effort has been made to ensure accuracy of transcription, but any obvious errors or omissions should be clarified with the author of the document.

## 2025-03-25 ENCOUNTER — PROCEDURE VISIT (OUTPATIENT)
Dept: MATERNAL FETAL MEDICINE | Facility: CLINIC | Age: 30
End: 2025-03-25
Payer: MEDICAID

## 2025-03-25 ENCOUNTER — OFFICE VISIT (OUTPATIENT)
Dept: MATERNAL FETAL MEDICINE | Facility: CLINIC | Age: 30
End: 2025-03-25
Payer: MEDICAID

## 2025-03-25 VITALS
BODY MASS INDEX: 47.65 KG/M2 | WEIGHT: 286 LBS | HEIGHT: 65 IN | HEART RATE: 87 BPM | SYSTOLIC BLOOD PRESSURE: 108 MMHG | DIASTOLIC BLOOD PRESSURE: 74 MMHG

## 2025-03-25 DIAGNOSIS — Z36.89 ENCOUNTER FOR ULTRASOUND TO ASSESS FETAL GROWTH: ICD-10-CM

## 2025-03-25 DIAGNOSIS — O34.12 LEIOMYOMA OF UTERUS AFFECTING PREGNANCY IN SECOND TRIMESTER: ICD-10-CM

## 2025-03-25 DIAGNOSIS — O10.012 PRE-EXISTING ESSENTIAL HYPERTENSION AFFECTING PREGNANCY IN SECOND TRIMESTER: ICD-10-CM

## 2025-03-25 DIAGNOSIS — O99.212 SEVERE OBESITY DUE TO EXCESS CALORIES AFFECTING PREGNANCY IN SECOND TRIMESTER: ICD-10-CM

## 2025-03-25 DIAGNOSIS — O99.320 MARIJUANA USE DURING PREGNANCY: ICD-10-CM

## 2025-03-25 DIAGNOSIS — O99.213 SEVERE OBESITY DUE TO EXCESS CALORIES AFFECTING PREGNANCY IN THIRD TRIMESTER: Primary | ICD-10-CM

## 2025-03-25 DIAGNOSIS — E66.01 SEVERE OBESITY DUE TO EXCESS CALORIES AFFECTING PREGNANCY IN THIRD TRIMESTER: Primary | ICD-10-CM

## 2025-03-25 DIAGNOSIS — O10.013 PRE-EXISTING ESSENTIAL HYPERTENSION AFFECTING PREGNANCY IN THIRD TRIMESTER: ICD-10-CM

## 2025-03-25 DIAGNOSIS — D25.9 LEIOMYOMA OF UTERUS AFFECTING PREGNANCY IN SECOND TRIMESTER: ICD-10-CM

## 2025-03-25 DIAGNOSIS — E66.01 SEVERE OBESITY DUE TO EXCESS CALORIES AFFECTING PREGNANCY IN SECOND TRIMESTER: ICD-10-CM

## 2025-03-25 DIAGNOSIS — F12.90 MARIJUANA USE DURING PREGNANCY: ICD-10-CM

## 2025-03-25 DIAGNOSIS — O26.02 EXCESSIVE WEIGHT GAIN IN PREGNANCY IN SECOND TRIMESTER: ICD-10-CM

## 2025-03-25 DIAGNOSIS — O34.13 LEIOMYOMA OF UTERUS AFFECTING PREGNANCY IN THIRD TRIMESTER: ICD-10-CM

## 2025-03-25 DIAGNOSIS — D25.9 LEIOMYOMA OF UTERUS AFFECTING PREGNANCY IN THIRD TRIMESTER: ICD-10-CM

## 2025-04-01 DIAGNOSIS — E66.01 SEVERE OBESITY DUE TO EXCESS CALORIES AFFECTING PREGNANCY IN THIRD TRIMESTER: ICD-10-CM

## 2025-04-01 DIAGNOSIS — O10.013 PRE-EXISTING ESSENTIAL HYPERTENSION AFFECTING PREGNANCY IN THIRD TRIMESTER: Primary | ICD-10-CM

## 2025-04-01 DIAGNOSIS — O99.213 SEVERE OBESITY DUE TO EXCESS CALORIES AFFECTING PREGNANCY IN THIRD TRIMESTER: ICD-10-CM

## 2025-04-15 ENCOUNTER — PROCEDURE VISIT (OUTPATIENT)
Dept: MATERNAL FETAL MEDICINE | Facility: CLINIC | Age: 30
End: 2025-04-15
Payer: MEDICAID

## 2025-04-15 ENCOUNTER — OFFICE VISIT (OUTPATIENT)
Dept: MATERNAL FETAL MEDICINE | Facility: CLINIC | Age: 30
End: 2025-04-15
Payer: MEDICAID

## 2025-04-15 VITALS
BODY MASS INDEX: 48.82 KG/M2 | HEART RATE: 88 BPM | SYSTOLIC BLOOD PRESSURE: 122 MMHG | DIASTOLIC BLOOD PRESSURE: 89 MMHG | HEIGHT: 65 IN | WEIGHT: 293 LBS

## 2025-04-15 DIAGNOSIS — O99.320 MARIJUANA USE DURING PREGNANCY: Primary | ICD-10-CM

## 2025-04-15 DIAGNOSIS — O34.13 LEIOMYOMA OF UTERUS AFFECTING PREGNANCY IN THIRD TRIMESTER: ICD-10-CM

## 2025-04-15 DIAGNOSIS — E66.01 SEVERE OBESITY DUE TO EXCESS CALORIES AFFECTING PREGNANCY IN THIRD TRIMESTER: ICD-10-CM

## 2025-04-15 DIAGNOSIS — D25.9 LEIOMYOMA OF UTERUS AFFECTING PREGNANCY IN THIRD TRIMESTER: ICD-10-CM

## 2025-04-15 DIAGNOSIS — O10.013 PRE-EXISTING ESSENTIAL HYPERTENSION AFFECTING PREGNANCY IN THIRD TRIMESTER: ICD-10-CM

## 2025-04-15 DIAGNOSIS — F12.90 MARIJUANA USE DURING PREGNANCY: Primary | ICD-10-CM

## 2025-04-15 DIAGNOSIS — O99.213 SEVERE OBESITY DUE TO EXCESS CALORIES AFFECTING PREGNANCY IN THIRD TRIMESTER: ICD-10-CM

## 2025-04-15 PROCEDURE — 76819 FETAL BIOPHYS PROFIL W/O NST: CPT | Mod: S$GLB,,, | Performed by: OBSTETRICS & GYNECOLOGY

## 2025-04-15 PROCEDURE — 76820 UMBILICAL ARTERY ECHO: CPT | Mod: S$GLB,,, | Performed by: OBSTETRICS & GYNECOLOGY

## 2025-04-15 PROCEDURE — 76816 OB US FOLLOW-UP PER FETUS: CPT | Mod: S$GLB,,, | Performed by: OBSTETRICS & GYNECOLOGY

## 2025-04-15 NOTE — PROGRESS NOTES
"  Maternal Fetal Medicine Follow Up    Subjective:     Patient ID: [id number]    Chief Complaint: [facility] follow up w/us       HPI: Denise Ulloa is a 29 y.o. female  at 31w4d gestation with Estimated Date of Delivery: 25  who is here for follow-up consultation by M.    She has chronic hypertension diagnosed early this pregnancy.  She is on labetalol 300 mg every 12 hours.  Baseline labs on 2024:  Serum creatinine 0.61, 24 hour urine 203.7 mg protein.  She is on low-dose aspirin BID.  She had elevated BMI of 44 at initial OB visit.  On 2024, she had hemoglobin A1c 5.2%.  Her UDS on 11/15/24 returned positive of cannabinoids. She reported she stopped with discovery of pregnancy.  She has fibroid uterus.        Interval history since last MFM visit: None.. She denies any leaking fluid, vaginal bleeding, contractions, decreased fetal movement. Denies headaches, visual disturbances, or epigastric pain.    Pregnancy complications include:   Patient Active Problem List   Diagnosis    Increased BMI affecting pregnancy in third trimester    Pre-existing essential hypertension affecting pregnancy in third trimester    Marijuana use during pregnancy    Leiomyoma of uterus affecting pregnancy in third trimester       No changes to medical, surgical, family, social, or obstetric history.    Medications:  Current Outpatient Medications   Medication Instructions    aspirin (ECOTRIN) 81 mg, Oral, 2 times daily, Start after 12 weeks gestation.  At 35 weeks gestation, decrease to one 81mg tablet daily until delivery.    labetaloL (NORMODYNE) 300 mg, 3 times daily    prenatal vit/iron fum/folic ac (PRENATAL 1+1 ORAL) Take by mouth.       Review of Systems   12 point review of systems conducted, negative except as stated in the history of present illness. See HPI for details.      Objective:     Visit Vitals  /89 (BP Location: Left arm, Patient Position: Sitting)   Pulse 88   Ht 5' 5" (1.651 m)   Wt 133.8 kg " (295 lb)   BMI 49.09 kg/m²         Physical Exam  Vitals and nursing note reviewed.   Constitutional:       Appearance: Normal appearance.   HENT:      Head: Normocephalic and atraumatic.   Cardiovascular:      Rate and Rhythm: Normal rate and regular rhythm.   Pulmonary:      Effort: Pulmonary effort is normal. No respiratory distress.      Breath sounds: Normal breath sounds.   Abdominal:      Palpations: Abdomen is soft.      Tenderness: There is no abdominal tenderness.   Musculoskeletal:      Right lower leg: No edema.      Left lower leg: No edema.   Neurological:      Mental Status: She is alert and oriented to person, place, and time.   Psychiatric:         Mood and Affect: Mood normal.         Behavior: Behavior normal.         Thought Content: Thought content normal.         Judgment: Judgment normal.         Assessment/Plan:     29 y.o.  female with IUP at 31w4d    Chronic hypertension  There is low normal fetal growth with an EFW of 1618 g at the 19% and the AC at the 17%  AFV is normal.  Biophysical profile 8/8 on today, 04/15/2025   Because of range of error of ultrasound at this gestational age and possibility of more significant fetal growth restriction, a biophysical profile was done which was 8/8, and umbilical artery Doppler was done which was normal.    Chronic hypertension is associated with significant adverse pregnancy outcomes including  birth, fetal growth restriction, fetal demise, placental abruption, and  delivery. Based on findings of recent CHAP Study, it is recommended to utilize 140/90 as the threshold for initiation or titration of medical therapy for chronic hypertension in pregnancy, rather than the previously recommended threshold of 160/110. For patients on blood pressure medications at the start of pregnancy, in the absence of mitigating factors or side effects, they can be maintained on their medications, rather than discontinuing them and waiting to initiate  treatment for blood pressures in the severe range.    Vitals:    04/15/25 1013   BP: 122/89   With this BP, she was advised to continue labetalol 300 mg every 12 hours.     Low dose aspirin as discussed.    With low-normal fetal growth as above, we will plan to recheck in about 3 weeks.  Recommend twice weekly fetal testing, alternating with Primary OB, starting around 32 weeks gestation and continued until the end of pregnancy. Fetal kick count instructions reviewed.     Among patients with uncomplicated chronic hypertension with no additional risk factors, delivery at 38-39 weeks appears to provide optimal balance between the risk of adverse fetal and  outcomes. However, will reassess closer to EDC to determine optimal timeframe or GA for delivery, based on current evaluation at that time.     Recommend close postpartum followup with Primary OB as well as PCP for ongoing evaluation/treatment of HTN.        Elevated BMI continued excessive weight gain  Body mass index is 49.09 kg/m². With excessive weight gain of 9 lb in about 3 weeks, reviewed importance of following healthy, low-sodium diet to avoid any additional excessive weight gain. Excess weight gain would be associated with worsening hypertension, gestational diabetes and adverse  outcomes, including fetal demise in utero.    Fetal surveillance as above.    Preoperative antibiotics and thromboprophylaxis with use of pneumatic compression devices is recommend in those with very elevated BMI undergoing  delivery. Thromboprophylaxis should also be used with those on prolonged immobilization.    It is important to lose weight after the pregnancy is over, especially before a future pregnancy was discussed. Breastfeeding may be an important tool in reducing the postpartum weight retention. Fetal risks were discussed with short term risk of fetal/ obesity and long term risk of adolescent component of metabolic syndrome.         Previous marijuana use  Previously discussed risks.  Encouraged continued abstinence throughout the pregnancy.  Consider intermittent drug screens throughout the pregnancy with risks/benefits of UDS discussed.        Leiomyoma in pregnancy  Largest mean 4.8cm on 4/15/25    Risks of fibroids include  delivery,  PROM, abnormal presentations, higher risk of  sections, placenta previa, fibroid degeneration, poor fetal growth, oligohydramnios, vaginal bleeding and postpartum hemorrhage and in rare circumstances obstruction of lower uterus/cervix, impeding vaginal delivery    We will follow fetal growth as above.  If concern for degenerating fibroids, could administer short course of NSAID (48-72hrs of Indocin) prior to 32 weeks gestation.     Reviewed instructions to report any increased pain, cramps, vaginal discharge or bleeding. Emphasized the importance of monitoring fetal kick count activity, and reporting immediately if decreased fetal movement occurs.    Document postpartum hemorrhage risk on admission to hospital, ensure T&S sent, and consider type and crossmatch depending on postpartum hemorrhage risk       Preeclampsia prophylaxis  She was advised to continue asa 81 mg BID until 34 6/7 weeks, then decrease to once daily until delivery. Preeclampsia precautions  reviewed.       Follow up in about 1 week (around 2025) for M Followup, BPP, Friday.     No future appointments.    JILLIAN Cai      This note was created with the assistance of [employer] voice recognition software. There may be transcription errors as a result of using this technology, however minimal. Effort has been made to ensure accuracy of transcription, but any obvious errors or omissions should be clarified with the author of the document.

## 2025-04-21 DIAGNOSIS — O99.320 MARIJUANA USE DURING PREGNANCY: Primary | ICD-10-CM

## 2025-04-21 DIAGNOSIS — O10.013 PRE-EXISTING ESSENTIAL HYPERTENSION AFFECTING PREGNANCY IN THIRD TRIMESTER: ICD-10-CM

## 2025-04-21 DIAGNOSIS — D25.9 LEIOMYOMA OF UTERUS AFFECTING PREGNANCY IN THIRD TRIMESTER: ICD-10-CM

## 2025-04-21 DIAGNOSIS — O99.213 SEVERE OBESITY DUE TO EXCESS CALORIES AFFECTING PREGNANCY IN THIRD TRIMESTER: ICD-10-CM

## 2025-04-21 DIAGNOSIS — O34.13 LEIOMYOMA OF UTERUS AFFECTING PREGNANCY IN THIRD TRIMESTER: ICD-10-CM

## 2025-04-21 DIAGNOSIS — F12.90 MARIJUANA USE DURING PREGNANCY: Primary | ICD-10-CM

## 2025-04-21 DIAGNOSIS — E66.01 SEVERE OBESITY DUE TO EXCESS CALORIES AFFECTING PREGNANCY IN THIRD TRIMESTER: ICD-10-CM

## 2025-04-25 ENCOUNTER — OFFICE VISIT (OUTPATIENT)
Dept: MATERNAL FETAL MEDICINE | Facility: CLINIC | Age: 30
End: 2025-04-25
Payer: MEDICAID

## 2025-04-25 ENCOUNTER — PROCEDURE VISIT (OUTPATIENT)
Dept: MATERNAL FETAL MEDICINE | Facility: CLINIC | Age: 30
End: 2025-04-25
Payer: MEDICAID

## 2025-04-25 VITALS
DIASTOLIC BLOOD PRESSURE: 75 MMHG | SYSTOLIC BLOOD PRESSURE: 116 MMHG | HEIGHT: 65 IN | HEART RATE: 95 BPM | WEIGHT: 293 LBS | BODY MASS INDEX: 48.82 KG/M2

## 2025-04-25 DIAGNOSIS — O10.013 PRE-EXISTING ESSENTIAL HYPERTENSION AFFECTING PREGNANCY IN THIRD TRIMESTER: Primary | ICD-10-CM

## 2025-04-25 DIAGNOSIS — F12.90 MARIJUANA USE DURING PREGNANCY: ICD-10-CM

## 2025-04-25 DIAGNOSIS — E66.01 SEVERE OBESITY DUE TO EXCESS CALORIES AFFECTING PREGNANCY IN THIRD TRIMESTER: ICD-10-CM

## 2025-04-25 DIAGNOSIS — O34.13 LEIOMYOMA OF UTERUS AFFECTING PREGNANCY IN THIRD TRIMESTER: ICD-10-CM

## 2025-04-25 DIAGNOSIS — D25.9 LEIOMYOMA OF UTERUS AFFECTING PREGNANCY IN THIRD TRIMESTER: ICD-10-CM

## 2025-04-25 DIAGNOSIS — O26.03 EXCESSIVE WEIGHT GAIN IN PREGNANCY IN THIRD TRIMESTER: ICD-10-CM

## 2025-04-25 DIAGNOSIS — O10.013 PRE-EXISTING ESSENTIAL HYPERTENSION AFFECTING PREGNANCY IN THIRD TRIMESTER: ICD-10-CM

## 2025-04-25 DIAGNOSIS — O99.320 MARIJUANA USE DURING PREGNANCY: ICD-10-CM

## 2025-04-25 DIAGNOSIS — O99.213 SEVERE OBESITY DUE TO EXCESS CALORIES AFFECTING PREGNANCY IN THIRD TRIMESTER: ICD-10-CM

## 2025-04-25 NOTE — PROGRESS NOTES
Maternal Fetal Medicine Follow Up    Subjective:     Patient ID: [id number]    Chief Complaint: M follow up with US      HPI: Denise Ulloa is a 29 y.o. female  at 33w0d gestation with Estimated Date of Delivery: 25  who is here for follow-up consultation by M.    She has chronic hypertension diagnosed early this pregnancy.  She is on labetalol 300 mg every 12 hours  .  Baseline labs on 2024:  Serum creatinine 0.61, 24 hour urine 203.7 mg protein.  She is on low-dose aspirin BID.  She had elevated BMI of 44 at initial OB visit.  On 2024, she had hemoglobin A1c 5.2%.  Her UDS on 11/15/24 returned positive of cannabinoids. She reported she stopped with discovery of pregnancy.  She has fibroid uterus.        Interval history since last MFM visit: None.. She denies any leaking fluid, vaginal bleeding, contractions, decreased fetal movement. Denies headaches, visual disturbances, or epigastric pain.    Pregnancy complications include:   Patient Active Problem List   Diagnosis    Increased BMI affecting pregnancy in third trimester    Pre-existing essential hypertension affecting pregnancy in third trimester    Marijuana use during pregnancy    Leiomyoma of uterus affecting pregnancy in third trimester    Excessive weight gain in pregnancy in third trimester       No changes to medical, surgical, family, social, or obstetric history.    Medications:  Current Outpatient Medications   Medication Instructions    aspirin (ECOTRIN) 81 mg, Oral, 2 times daily, Start after 12 weeks gestation.  At 35 weeks gestation, decrease to one 81mg tablet daily until delivery.    labetaloL (NORMODYNE) 300 mg, 3 times daily    prenatal vit/iron fum/folic ac (PRENATAL 1+1 ORAL) Take by mouth.       Review of Systems   12 point review of systems conducted, negative except as stated in the history of present illness. See HPI for details.      Objective:     Visit Vitals  /75 (BP Location: Right arm, Patient  Pt called requesting a note from Dr Radha Bangura releasing him to work [facility]  Hx: Seizures - Seeing Neurology @     CLINICAL Recommendations - Pt was asked to call  Neuro for note "Position: Sitting)   Pulse 95   Ht 5' 5" (1.651 m)   Wt 134.7 kg (297 lb)   BMI 49.42 kg/m²           Physical Exam  Vitals and nursing note reviewed.   Constitutional:       Appearance: Normal appearance.      Comments: Increased BMI   HENT:      Head: Normocephalic and atraumatic.      Nose: Nose normal. No congestion.   Cardiovascular:      Rate and Rhythm: Normal rate.   Pulmonary:      Effort: Pulmonary effort is normal.   Skin:     Findings: No rash.   Neurological:      Mental Status: She is alert and oriented to person, place, and time.   Psychiatric:         Mood and Affect: Mood normal.         Behavior: Behavior normal.         Thought Content: Thought content normal.         Judgment: Judgment normal.         Assessment/Plan:     29 y.o.  female with IUP at 33w0d    Chronic hypertension  There is low normal fetal growth with an EFW of 1618 g at the 19% and the AC at the 17%  AFV is normal.  Biophysical profile  on today, 2025        Chronic hypertension is associated with significant adverse pregnancy outcomes including  birth, fetal growth restriction, fetal demise, placental abruption, and  delivery. Based on findings of recent CHAP Study, it is recommended to utilize 140/90 as the threshold for initiation or titration of medical therapy for chronic hypertension in pregnancy, rather than the previously recommended threshold of 160/110. For patients on blood pressure medications at the start of pregnancy, in the absence of mitigating factors or side effects, they can be maintained on their medications, rather than discontinuing them and waiting to initiate treatment for blood pressures in the severe range.    Vitals:    25 1017 25 1019   BP: (!) 127/92 116/75     With this BP, she was advised to continue labetalol 300 mg every 12 hours  .     Low dose aspirin as discussed.    With low-normal fetal growth as above, we will plan to recheck in about 3 weeks from last " ultrasound.  Recommend twice weekly fetal testing, alternating with Primary OB. Fetal kick count instructions reviewed.     Among patients with uncomplicated chronic hypertension with no additional risk factors, delivery at 38-39 weeks appears to provide optimal balance between the risk of adverse fetal and  outcomes. However, will reassess closer to EDC to determine optimal timeframe or GA for delivery, based on current evaluation at that time.     Recommend close postpartum followup with Primary OB as well as PCP for ongoing evaluation/treatment of HTN.        Elevated BMI continued excessive weight gain  Body mass index is 49.42 kg/m².  Patient is 2 lb more than 10 days ago but has gain 9 lb in about 1 month , reviewed importance of following healthy, low-sodium diet to avoid any additional excessive weight gain. Excess weight gain would be associated with worsening hypertension, gestational diabetes and adverse  outcomes, including fetal demise in utero.    Fetal surveillance as above.    Preoperative antibiotics and thromboprophylaxis with use of pneumatic compression devices is recommend in those with very elevated BMI undergoing  delivery. Thromboprophylaxis should also be used with those on prolonged immobilization.    It is important to lose weight after the pregnancy is over, especially before a future pregnancy was discussed. Breastfeeding may be an important tool in reducing the postpartum weight retention. Fetal risks were discussed with short term risk of fetal/ obesity and long term risk of adolescent component of metabolic syndrome.        Previous marijuana use  Previously discussed risks.  Encouraged continued abstinence throughout the pregnancy.  Consider intermittent drug screens throughout the pregnancy with risks/benefits of UDS discussed.        Leiomyoma in pregnancy  Largest mean 4.8cm on 4/15/25    Risks of fibroids include  delivery,  PROM,  abnormal presentations, higher risk of  sections, placenta previa, fibroid degeneration, poor fetal growth, oligohydramnios, vaginal bleeding and postpartum hemorrhage and in rare circumstances obstruction of lower uterus/cervix, impeding vaginal delivery    We will follow fetal growth as above.  If concern for degenerating fibroids, could administer short course of NSAID (48-72hrs of Indocin) prior to 32 weeks gestation.     Reviewed instructions to report any increased pain, cramps, vaginal discharge or bleeding. Emphasized the importance of monitoring fetal kick count activity, and reporting immediately if decreased fetal movement occurs.    Document postpartum hemorrhage risk on admission to hospital, ensure T&S sent, and consider type and crossmatch depending on postpartum hemorrhage risk       Preeclampsia prophylaxis  She was advised to continue asa 81 mg BID until 34 6/7 weeks, then decrease to once daily until delivery. Preeclampsia precautions  reviewed.       Follow up for keep return appointment.     Future Appointments   Date Time Provider Department Center   2025  8:15 AM ROOM 2, [facility] [place]   2025  8:30 AM Mo Garner MD [facility] [place]   2025  8:30 AM Mo Garner MD [facility] [place]   2025  8:30 AM ROOM 1, [facility] [place]   2025  9:00 AM ROOM 2, [facility] [place]   2025  9:30 AM Mo Garner MD [facility] [place]       Patient was evaluated and examined by Dr. Garner. Linda Hayden MA, helped in pre charting of part of note.       This note was created with the assistance of [employer] voice recognition software. There may be transcription errors as a result of using this technology, however minimal. Effort has been made to ensure accuracy of transcription, but any obvious errors or omissions should be clarified with the author of the document.

## 2025-04-28 DIAGNOSIS — O10.013 PRE-EXISTING ESSENTIAL HYPERTENSION AFFECTING PREGNANCY IN THIRD TRIMESTER: Primary | ICD-10-CM

## 2025-04-28 DIAGNOSIS — E66.01 SEVERE OBESITY DUE TO EXCESS CALORIES AFFECTING PREGNANCY IN THIRD TRIMESTER: ICD-10-CM

## 2025-04-28 DIAGNOSIS — O99.213 SEVERE OBESITY DUE TO EXCESS CALORIES AFFECTING PREGNANCY IN THIRD TRIMESTER: ICD-10-CM

## 2025-05-01 NOTE — PROGRESS NOTES
Maternal Fetal Medicine Follow Up    Subjective:     Patient ID: [id number]    Chief Complaint: [facility] follow up w/us       HPI: Denise Ulloa is a 29 y.o. female  at 34w0d gestation with Estimated Date of Delivery: 25  who is here for follow-up consultation by [facility].    She has chronic hypertension diagnosed early this pregnancy.  She is on labetalol 300 mg every 12 hours  .  Baseline labs on 2024:  Serum creatinine 0.61, 24 hour urine 203.7 mg protein.  She is on low-dose aspirin BID.  She had elevated BMI of 44 at initial OB visit.  On 2024, she had hemoglobin A1c 5.2%.  Her UDS on 11/15/24 returned positive of cannabinoids. She reported she stopped with discovery of pregnancy.  She has fibroid uterus.  Patient is accompanied by her mother.       Interval history since last M visit: None.. She denies any leaking fluid, vaginal bleeding, contractions, decreased fetal movement. Denies headaches, visual disturbances, or epigastric pain.    Pregnancy complications include:   Patient Active Problem List   Diagnosis    Increased BMI affecting pregnancy in third trimester    Pre-existing essential hypertension affecting pregnancy in third trimester    Marijuana use during pregnancy    Leiomyoma of uterus affecting pregnancy in third trimester    Excessive weight gain in pregnancy in third trimester       No changes to medical, surgical, family, social, or obstetric history.    Medications:  Current Outpatient Medications   Medication Instructions    aspirin (ECOTRIN) 81 mg, Oral, 2 times daily, Start after 12 weeks gestation.  At 35 weeks gestation, decrease to one 81mg tablet daily until delivery.    labetaloL (NORMODYNE) 300 mg, 3 times daily    prenatal vit/iron fum/folic ac (PRENATAL 1+1 ORAL) Take by mouth.       Review of Systems   12 point review of systems conducted, negative except as stated in the history of present illness. See HPI for details.      Objective:     Visit Vitals  /86  "(BP Location: Left arm, Patient Position: Sitting)   Pulse 92   Ht 5' 5" (1.651 m)   Wt 134.3 kg (296 lb)   BMI 49.26 kg/m²             Physical Exam  Vitals and nursing note reviewed.   Constitutional:       Appearance: Normal appearance.      Comments: Increased BMI   HENT:      Head: Normocephalic and atraumatic.      Nose: Nose normal. No congestion.   Cardiovascular:      Rate and Rhythm: Normal rate.   Pulmonary:      Effort: Pulmonary effort is normal.   Skin:     Findings: No rash.   Neurological:      Mental Status: She is alert and oriented to person, place, and time.   Psychiatric:         Mood and Affect: Mood normal.         Behavior: Behavior normal.         Thought Content: Thought content normal.         Judgment: Judgment normal.         Assessment/Plan:     29 y.o.  female with IUP at 34w0d    Chronic hypertension  There is low normal fetal growth with an EFW of 1618 g at the 19% and the AC at the 17% on 04/15/2025  AFV is normal.  Biophysical profile  on today, 2025          Chronic hypertension is associated with significant adverse pregnancy outcomes including  birth, fetal growth restriction, fetal demise, placental abruption, and  delivery. Based on findings of recent CHAP Study, it is recommended to utilize 140/90 as the threshold for initiation or titration of medical therapy for chronic hypertension in pregnancy, rather than the previously recommended threshold of 160/110. For patients on blood pressure medications at the start of pregnancy, in the absence of mitigating factors or side effects, they can be maintained on their medications, rather than discontinuing them and waiting to initiate treatment for blood pressures in the severe range.    Vitals:    25 0834   BP: 119/86       With this BP  , she was advised to continue labetalol 300 mg every 12 hours  .     Low dose aspirin as discussed.    With low-normal fetal growth as above, we will plan to " rechecked growth next week in 3 weeks from that ultrasound .  Recommend twice weekly fetal testing, alternating with Primary OB. Fetal kick count instructions reviewed.     Among patients with uncomplicated chronic hypertension with no additional risk factors, delivery at 38-39 weeks appears to provide optimal balance between the risk of adverse fetal and  outcomes. However, will reassess closer to EDC to determine optimal timeframe or GA for delivery, based on current evaluation at that time.     Recommend close postpartum followup with Primary OB as well as PCP for ongoing evaluation/treatment of HTN.        Elevated BMI with prior excessive weight gain  Body mass index is 49.26 kg/m².  Stable weight over the last few weeks  , reviewed importance of following healthy, low-sodium diet to avoid any additional excessive weight gain. Excess weight gain would be associated with worsening hypertension, gestational diabetes and adverse  outcomes, including fetal demise in utero.    Fetal surveillance as above.    Preoperative antibiotics and thromboprophylaxis with use of pneumatic compression devices is recommend in those with very elevated BMI undergoing  delivery. Thromboprophylaxis should also be used with those on prolonged immobilization.    It is important to lose weight after the pregnancy is over, especially before a future pregnancy was discussed. Breastfeeding may be an important tool in reducing the postpartum weight retention. Fetal risks were discussed with short term risk of fetal/ obesity and long term risk of adolescent component of metabolic syndrome.        Previous marijuana use  Previously discussed risks.  Encouraged continued abstinence throughout the pregnancy.  Consider intermittent drug screens throughout the pregnancy with risks/benefits of UDS discussed.        Leiomyoma in pregnancy  Largest mean 4.8cm on 4/15/25    Risks of fibroids include  delivery,   PROM, abnormal presentations, higher risk of  sections, placenta previa, fibroid degeneration, poor fetal growth, oligohydramnios, vaginal bleeding and postpartum hemorrhage and in rare circumstances obstruction of lower uterus/cervix, impeding vaginal delivery    We will follow fetal growth as above.  If concern for degenerating fibroids, could administer short course of NSAID (48-72hrs of Indocin) prior to 32 weeks gestation.     Reviewed instructions to report any increased pain, cramps, vaginal discharge or bleeding. Emphasized the importance of monitoring fetal kick count activity, and reporting immediately if decreased fetal movement occurs.    Document postpartum hemorrhage risk on admission to hospital, ensure T&S sent, and consider type and crossmatch depending on postpartum hemorrhage risk       Preeclampsia prophylaxis  She was advised to continue asa 81 mg BID until 34 6/7 weeks, then decrease to once daily until delivery. Preeclampsia precautions  reviewed.       Follow up in about 1 week (around 2025) for MFM follow-up, Repeat  ultrasound, BPP.     Future Appointments   Date Time Provider Department Center   20[facility]  8:[facility] [place]   2025  8:30 AM ROOM 1, [facility] [place]   2025  9:00 AM ROOM 2, [facility] US M [facility] [place]   2025  9:30 AM [facility] [place]       Patient was evaluated and examined by Dr. Garner. Linda Hayden MA, helped in pre charting of part of note.       This note was created with the assistance of [employer] voice recognition software. There may be transcription errors as a result of using this technology, however minimal. Effort has been made to ensure accuracy of transcription, but any obvious errors or omissions should be clarified with the author of the document.

## 2025-05-02 ENCOUNTER — OFFICE VISIT (OUTPATIENT)
Dept: MATERNAL FETAL MEDICINE | Facility: CLINIC | Age: 30
End: 2025-05-02
Payer: MEDICAID

## 2025-05-02 ENCOUNTER — PROCEDURE VISIT (OUTPATIENT)
Dept: MATERNAL FETAL MEDICINE | Facility: CLINIC | Age: 30
End: 2025-05-02
Payer: MEDICAID

## 2025-05-02 VITALS
SYSTOLIC BLOOD PRESSURE: 119 MMHG | BODY MASS INDEX: 48.82 KG/M2 | DIASTOLIC BLOOD PRESSURE: 86 MMHG | HEART RATE: 92 BPM | WEIGHT: 293 LBS | HEIGHT: 65 IN

## 2025-05-02 DIAGNOSIS — O99.320 MARIJUANA USE DURING PREGNANCY: ICD-10-CM

## 2025-05-02 DIAGNOSIS — O26.03 EXCESSIVE WEIGHT GAIN IN PREGNANCY IN THIRD TRIMESTER: ICD-10-CM

## 2025-05-02 DIAGNOSIS — O99.213 SEVERE OBESITY DUE TO EXCESS CALORIES AFFECTING PREGNANCY IN THIRD TRIMESTER: ICD-10-CM

## 2025-05-02 DIAGNOSIS — F12.90 MARIJUANA USE DURING PREGNANCY: ICD-10-CM

## 2025-05-02 DIAGNOSIS — E66.01 SEVERE OBESITY DUE TO EXCESS CALORIES AFFECTING PREGNANCY IN THIRD TRIMESTER: ICD-10-CM

## 2025-05-02 DIAGNOSIS — O10.013 PRE-EXISTING ESSENTIAL HYPERTENSION AFFECTING PREGNANCY IN THIRD TRIMESTER: Primary | ICD-10-CM

## 2025-05-02 DIAGNOSIS — O10.013 PRE-EXISTING ESSENTIAL HYPERTENSION AFFECTING PREGNANCY IN THIRD TRIMESTER: ICD-10-CM

## 2025-05-05 DIAGNOSIS — O10.013 PRE-EXISTING ESSENTIAL HYPERTENSION AFFECTING PREGNANCY IN THIRD TRIMESTER: Primary | ICD-10-CM

## 2025-05-05 DIAGNOSIS — O26.03 EXCESSIVE WEIGHT GAIN IN PREGNANCY IN THIRD TRIMESTER: ICD-10-CM

## 2025-05-05 DIAGNOSIS — D25.9 LEIOMYOMA OF UTERUS AFFECTING PREGNANCY IN THIRD TRIMESTER: ICD-10-CM

## 2025-05-05 DIAGNOSIS — F12.90 MARIJUANA USE DURING PREGNANCY: ICD-10-CM

## 2025-05-05 DIAGNOSIS — O99.320 MARIJUANA USE DURING PREGNANCY: ICD-10-CM

## 2025-05-05 DIAGNOSIS — O34.13 LEIOMYOMA OF UTERUS AFFECTING PREGNANCY IN THIRD TRIMESTER: ICD-10-CM

## 2025-05-05 DIAGNOSIS — O99.213 SEVERE OBESITY DUE TO EXCESS CALORIES AFFECTING PREGNANCY IN THIRD TRIMESTER: ICD-10-CM

## 2025-05-05 DIAGNOSIS — E66.01 SEVERE OBESITY DUE TO EXCESS CALORIES AFFECTING PREGNANCY IN THIRD TRIMESTER: ICD-10-CM

## 2025-05-09 ENCOUNTER — PROCEDURE VISIT (OUTPATIENT)
Dept: MATERNAL FETAL MEDICINE | Facility: CLINIC | Age: 30
End: 2025-05-09
Payer: MEDICAID

## 2025-05-09 ENCOUNTER — OFFICE VISIT (OUTPATIENT)
Dept: MATERNAL FETAL MEDICINE | Facility: CLINIC | Age: 30
End: 2025-05-09
Payer: MEDICAID

## 2025-05-09 VITALS
OXYGEN SATURATION: 96 % | HEIGHT: 65 IN | HEART RATE: 89 BPM | WEIGHT: 293 LBS | DIASTOLIC BLOOD PRESSURE: 82 MMHG | SYSTOLIC BLOOD PRESSURE: 134 MMHG | RESPIRATION RATE: 20 BRPM | BODY MASS INDEX: 48.82 KG/M2

## 2025-05-09 DIAGNOSIS — F12.90 MARIJUANA USE DURING PREGNANCY: ICD-10-CM

## 2025-05-09 DIAGNOSIS — E66.01 SEVERE OBESITY DUE TO EXCESS CALORIES AFFECTING PREGNANCY IN THIRD TRIMESTER: ICD-10-CM

## 2025-05-09 DIAGNOSIS — O34.13 LEIOMYOMA OF UTERUS AFFECTING PREGNANCY IN THIRD TRIMESTER: ICD-10-CM

## 2025-05-09 DIAGNOSIS — O99.213 SEVERE OBESITY DUE TO EXCESS CALORIES AFFECTING PREGNANCY IN THIRD TRIMESTER: ICD-10-CM

## 2025-05-09 DIAGNOSIS — D25.9 LEIOMYOMA OF UTERUS AFFECTING PREGNANCY IN THIRD TRIMESTER: ICD-10-CM

## 2025-05-09 DIAGNOSIS — O10.013 PRE-EXISTING ESSENTIAL HYPERTENSION AFFECTING PREGNANCY IN THIRD TRIMESTER: ICD-10-CM

## 2025-05-09 DIAGNOSIS — O99.320 MARIJUANA USE DURING PREGNANCY: ICD-10-CM

## 2025-05-09 DIAGNOSIS — O10.013 PRE-EXISTING ESSENTIAL HYPERTENSION AFFECTING PREGNANCY IN THIRD TRIMESTER: Primary | ICD-10-CM

## 2025-05-09 NOTE — PROGRESS NOTES
Maternal Fetal Medicine Follow Up    Subjective:     Patient ID: [id number]    Chief Complaint: M Followup with U/S      HPI: Denise Ulloa is a 29 y.o. female  at 35w0d gestation with Estimated Date of Delivery: 25  who is here for follow-up consultation by M.    She has chronic hypertension diagnosed early this pregnancy.  She is on labetalol 300 mg every 12 hours  .  Baseline labs on 2024:  Serum creatinine 0.61, 24 hour urine 203.7 mg protein.  She is on low-dose aspirin daily.  She had elevated BMI of 44 at initial OB visit.  On 2024, she had hemoglobin A1c 5.2%.  Her UDS on 11/15/24 returned positive of cannabinoids. She reported she stopped with discovery of pregnancy.  She has fibroid uterus.         Interval history since last MFM visit: None.. She denies any leaking fluid, vaginal bleeding, contractions, decreased fetal movement. Denies headaches, visual disturbances, or epigastric pain.    Pregnancy complications include:   Patient Active Problem List   Diagnosis    Increased BMI affecting pregnancy in third trimester    Pre-existing essential hypertension affecting pregnancy in third trimester    Marijuana use during pregnancy    Leiomyoma of uterus affecting pregnancy in third trimester    Excessive weight gain in pregnancy in third trimester       No changes to medical, surgical, family, social, or obstetric history.    Medications:  Current Outpatient Medications   Medication Instructions    aspirin (ECOTRIN) 81 mg, Oral, 2 times daily, Start after 12 weeks gestation.  At 35 weeks gestation, decrease to one 81mg tablet daily until delivery.    labetaloL (NORMODYNE) 300 mg, 3 times daily    prenatal vit/iron fum/folic ac (PRENATAL 1+1 ORAL) Take by mouth.       Review of Systems   12 point review of systems conducted, negative except as stated in the history of present illness. See HPI for details.      Objective:     Visit Vitals  /82 (BP Location: Left arm, Patient  "Position: Sitting)   Pulse 89   Resp 20   Ht 5' 5" (1.651 m)   Wt 135.6 kg (299 lb)   SpO2 96%   BMI 49.76 kg/m²               Physical Exam  Vitals and nursing note reviewed.   Constitutional:       Appearance: Normal appearance.      Comments: Increased BMI   HENT:      Head: Normocephalic and atraumatic.      Nose: Nose normal. No congestion.   Cardiovascular:      Rate and Rhythm: Normal rate.   Pulmonary:      Effort: Pulmonary effort is normal.   Skin:     Findings: No rash.   Neurological:      Mental Status: She is alert and oriented to person, place, and time.   Psychiatric:         Mood and Affect: Mood normal.         Behavior: Behavior normal.         Thought Content: Thought content normal.         Judgment: Judgment normal.         Assessment/Plan:     29 y.o.  female with IUP at 35w0d    Chronic hypertension  There is normal fetal growth with an EFW of 2454 g at the 24% and the AC at the 46%.  On 2020  AFV is normal  .  Biophysical profile  on today, 2025            Chronic hypertension is associated with significant adverse pregnancy outcomes including  birth, fetal growth restriction, fetal demise, placental abruption, and  delivery. Based on findings of recent CHAP Study, it is recommended to utilize 140/90 as the threshold for initiation or titration of medical therapy for chronic hypertension in pregnancy, rather than the previously recommended threshold of 160/110. For patients on blood pressure medications at the start of pregnancy, in the absence of mitigating factors or side effects, they can be maintained on their medications, rather than discontinuing them and waiting to initiate treatment for blood pressures in the severe range.    Vitals:    25 0904   BP: 134/82         With this BP , she was advised to continue labetalol 300 mg every 12 hours  .     Low dose aspirin as discussed.    With low-normal fetal growth as above, we will plan to rechecked " growth next week in 3 weeks from that ultrasound .  Recommend twice weekly fetal testing, alternating with Primary OB. Fetal kick count instructions reviewed.     With chronic hypertension on medication and BMI over 45,  recommend delivery at 38 weeks' gestation (38-38 6/7th weeks) as optimal balance between prematurity risks and risks of continued pregnancy. Earlier delivery may be needed for worsening maternal or fetal status.     Recommend close postpartum followup with Primary OB as well as PCP for ongoing evaluation/treatment of HTN.        Elevated BMI with prior excessive weight gain  Body mass index is 49.76 kg/m².  With a continued excessive weight gain 2 lb in the last 2 weeks  , reviewed importance of following healthy, low-sodium diet to avoid any additional excessive weight gain. Excess weight gain would be associated with worsening hypertension, gestational diabetes and adverse  outcomes, including fetal demise in utero.    Fetal surveillance as above.    Preoperative antibiotics and thromboprophylaxis with use of pneumatic compression devices is recommend in those with very elevated BMI undergoing  delivery. Thromboprophylaxis should also be used with those on prolonged immobilization.    It is important to lose weight after the pregnancy is over, especially before a future pregnancy was discussed. Breastfeeding may be an important tool in reducing the postpartum weight retention. Fetal risks were discussed with short term risk of fetal/ obesity and long term risk of adolescent component of metabolic syndrome.        Previous marijuana use  Previously discussed risks.  Encouraged continued abstinence throughout the pregnancy.  Consider intermittent drug screens throughout the pregnancy with risks/benefits of UDS discussed.        Leiomyoma in pregnancy  Largest mean 4.8cm on 4/15/25    Risks of fibroids include  delivery,  PROM, abnormal presentations, higher  risk of  sections, placenta previa, fibroid degeneration, poor fetal growth, oligohydramnios, vaginal bleeding and postpartum hemorrhage and in rare circumstances obstruction of lower uterus/cervix, impeding vaginal delivery    We will follow fetal growth as above.  If concern for degenerating fibroids, could administer short course of NSAID (48-72hrs of Indocin) prior to 32 weeks gestation.     Reviewed instructions to report any increased pain, cramps, vaginal discharge or bleeding. Emphasized the importance of monitoring fetal kick count activity, and reporting immediately if decreased fetal movement occurs.    Document postpartum hemorrhage risk on admission to hospital, ensure T&S sent, and consider type and crossmatch depending on postpartum hemorrhage risk       Preeclampsia prophylaxis continue 81 mg Asprin daily until the end of pregnancy. Preeclampsia precautions  reviewed.       Follow up for keep return appointment.     Future Appointments   Date Time Provider Department Center   2025  9:00 AM ROOM 2, [facility] [place]   2025  9:30 AM Mo Garner MD [facility] [place]   2025  9:30 AM ROOM 1, [facility] [place]   2025 10:00 AM Mo Garner MD [facility] [place]       Patient was evaluated and examined by Dr. Garner. Linda Hayden MA, helped in pre charting of part of note.       This note was created with the assistance of [employer] voice recognition software. There may be transcription errors as a result of using this technology, however minimal. Effort has been made to ensure accuracy of transcription, but any obvious errors or omissions should be clarified with the author of the document.

## 2025-05-15 NOTE — PROGRESS NOTES
Maternal Fetal Medicine Follow Up    Subjective:     Patient ID: [id number]    Chief Complaint: m followup w/us      HPI: Denise Ulloa is a 29 y.o. female  at 36w0d gestation with Estimated Date of Delivery: 25  who is here for follow-up consultation by [facility].    She has chronic hypertension diagnosed early this pregnancy.  Her dose of labetalol was recently adjusted by Dr. tsai to 400 mg q.8 hours.  Baseline labs on 2024:  Serum creatinine 0.61, 24 hour urine 203.7 mg protein.  She is on low-dose aspirin daily.  She had elevated BMI of 44 at initial OB visit.  On 2024, she had hemoglobin A1c 5.2%.  Her UDS on 11/15/24 returned positive of cannabinoids. She reported she stopped with discovery of pregnancy.  She has fibroid uterus.         Interval history since last [facility] visit: None.. She denies any leaking fluid, vaginal bleeding, contractions, decreased fetal movement. Denies headaches, visual disturbances, or epigastric pain.    Pregnancy complications include:   Patient Active Problem List   Diagnosis    Increased BMI affecting pregnancy in third trimester    Pre-existing essential hypertension affecting pregnancy in third trimester    Marijuana use during pregnancy    Leiomyoma of uterus affecting pregnancy in third trimester    Excessive weight gain in pregnancy in third trimester       No changes to medical, surgical, family, social, or obstetric history.    Medications:  Current Outpatient Medications   Medication Instructions    aspirin (ECOTRIN) 81 mg, Oral, 2 times daily, Start after 12 weeks gestation.  At 35 weeks gestation, decrease to one 81mg tablet daily until delivery.    labetaloL (NORMODYNE) 300 mg, 3 times daily    labetaloL (NORMODYNE) 400 mg, 3 times daily    prenatal vit/iron fum/folic ac (PRENATAL 1+1 ORAL) Take by mouth.       Review of Systems   12 point review of systems conducted, negative except as stated in the history of present illness. See HPI for  "details.      Objective:     Visit Vitals  /88 (BP Location: Left arm, Patient Position: Sitting)   Pulse 86   Ht 5' 5" (1.651 m)   Wt (!) 137.3 kg (302 lb 11.2 oz)   BMI 50.37 kg/m²                 Physical Exam  Vitals and nursing note reviewed.   Constitutional:       Appearance: Normal appearance.      Comments: Increased BMI   HENT:      Head: Normocephalic and atraumatic.      Nose: Nose normal. No congestion.   Cardiovascular:      Rate and Rhythm: Normal rate.   Pulmonary:      Effort: Pulmonary effort is normal.   Skin:     Findings: No rash.   Neurological:      Mental Status: She is alert and oriented to person, place, and time.   Psychiatric:         Mood and Affect: Mood normal.         Behavior: Behavior normal.         Thought Content: Thought content normal.         Judgment: Judgment normal.         Assessment/Plan:     29 y.o.  female with IUP at 36w0d    Chronic hypertension  There was normal fetal growth with an EFW of 2454 g at the 24% and the AC at the 46% on 2025.  AFV is normal.  Biophysical profile  on today, 2025          Chronic hypertension is associated with significant adverse pregnancy outcomes including  birth, fetal growth restriction, fetal demise, placental abruption, and  delivery. Based on findings of recent CHAP Study, it is recommended to utilize 140/90 as the threshold for initiation or titration of medical therapy for chronic hypertension in pregnancy, rather than the previously recommended threshold of 160/110. For patients on blood pressure medications at the start of pregnancy, in the absence of mitigating factors or side effects, they can be maintained on their medications, rather than discontinuing them and waiting to initiate treatment for blood pressures in the severe range.    Vitals:    25 0917   BP: 135/88       With this BP, advised the patient to continue labetalol 400 mg q.8 hours.  Discussed with her that if evidence " of superimposed preeclampsia immediate delivery.  If the blood pressure worsens that we have to continue adjustment dose of medication even with no evidence of superimposed preeclampsia I would recommend delivery no later than 37 weeks gestation, as the risks of prematurity we would be outweighed by the risk of continued pregnancy in this setting.  Discussed with Dr. Greenberg    Low dose aspirin as discussed.    Recommend twice weekly fetal testing, alternating with Primary OB till delivery. Fetal kick count instructions reviewed.     With chronic hypertension on medication and BMI over 45,  recommend delivery at 38 weeks' gestation (38-38 6/7th weeks) as optimal balance between prematurity risks and risks of continued pregnancy. Earlier delivery may be needed for worsening maternal or fetal status.     Recommend close postpartum followup with Primary OB as well as PCP for ongoing evaluation/treatment of HTN.      Elevated BMI with excessive weight gain  Body mass index is 50.37 kg/m².  With excessive gain of 5 lb in about 1 month , and about 30 lb since initial visit at 12 weeks emphasized the importance of following healthy, low-sodium diet to avoid any additional excessive weight gain. Excess weight gain would be associated with worsening hypertension, gestational diabetes and adverse  outcomes, including fetal demise in utero.    Fetal surveillance as above.    Preoperative antibiotics and thromboprophylaxis with use of pneumatic compression devices is recommend in those with very elevated BMI undergoing  delivery. Thromboprophylaxis should also be used with those on prolonged immobilization.    It is important to lose weight after the pregnancy is over, especially before a future pregnancy was discussed. Breastfeeding may be an important tool in reducing the postpartum weight retention. Fetal risks were discussed with short term risk of fetal/ obesity and long term risk of adolescent  component of metabolic syndrome.      Previous marijuana use  Previously discussed risks.  Encouraged continued abstinence throughout the pregnancy.  Consider intermittent drug screens throughout the pregnancy with risks/benefits of UDS discussed.        Leiomyoma in pregnancy  Risks of fibroids include  delivery,  PROM, abnormal presentations, higher risk of  sections, placenta previa, fibroid degeneration, poor fetal growth, oligohydramnios, vaginal bleeding and postpartum hemorrhage and in rare circumstances obstruction of lower uterus/cervix, impeding vaginal delivery    Reviewed instructions to report any increased pain, cramps, vaginal discharge or bleeding. Emphasized the importance of monitoring fetal kick count activity, and reporting immediately if decreased fetal movement occurs.    Document postpartum hemorrhage risk on admission to hospital, ensure T&S sent, and consider type and crossmatch depending on postpartum hemorrhage risk       Preeclampsia prophylaxis   She was advised to continue 81 mg aspirin daily until the end of pregnancy. Preeclampsia precautions  reviewed.       Follow up for Keep return appointment..     Future Appointments   Date Time Provider Department Center   2025  9:30 AM ROOM 1, [facility] [place]   2025 10:00 AM Mo Garner MD [facility] [place]       Patient was evaluated and examined by Dr. Garner. Kiara Keller MA, helped in pre charting of part of note.      This note was created with the assistance of [employer] voice recognition software. There may be transcription errors as a result of using this technology, however minimal. Effort has been made to ensure accuracy of transcription, but any obvious errors or omissions should be clarified with the author of the document.

## 2025-05-16 ENCOUNTER — PROCEDURE VISIT (OUTPATIENT)
Dept: MATERNAL FETAL MEDICINE | Facility: CLINIC | Age: 30
End: 2025-05-16
Payer: MEDICAID

## 2025-05-16 ENCOUNTER — OFFICE VISIT (OUTPATIENT)
Dept: MATERNAL FETAL MEDICINE | Facility: CLINIC | Age: 30
End: 2025-05-16
Payer: MEDICAID

## 2025-05-16 VITALS
DIASTOLIC BLOOD PRESSURE: 88 MMHG | BODY MASS INDEX: 48.82 KG/M2 | SYSTOLIC BLOOD PRESSURE: 135 MMHG | HEART RATE: 86 BPM | HEIGHT: 65 IN | WEIGHT: 293 LBS

## 2025-05-16 DIAGNOSIS — O10.013 PRE-EXISTING ESSENTIAL HYPERTENSION AFFECTING PREGNANCY IN THIRD TRIMESTER: ICD-10-CM

## 2025-05-16 DIAGNOSIS — E66.01 SEVERE OBESITY DUE TO EXCESS CALORIES AFFECTING PREGNANCY IN THIRD TRIMESTER: ICD-10-CM

## 2025-05-16 DIAGNOSIS — D25.9 LEIOMYOMA OF UTERUS AFFECTING PREGNANCY IN THIRD TRIMESTER: ICD-10-CM

## 2025-05-16 DIAGNOSIS — O26.03 EXCESSIVE WEIGHT GAIN IN PREGNANCY IN THIRD TRIMESTER: ICD-10-CM

## 2025-05-16 DIAGNOSIS — O99.213 SEVERE OBESITY DUE TO EXCESS CALORIES AFFECTING PREGNANCY IN THIRD TRIMESTER: ICD-10-CM

## 2025-05-16 DIAGNOSIS — O34.13 LEIOMYOMA OF UTERUS AFFECTING PREGNANCY IN THIRD TRIMESTER: ICD-10-CM

## 2025-05-16 DIAGNOSIS — F12.90 MARIJUANA USE DURING PREGNANCY: Primary | ICD-10-CM

## 2025-05-16 DIAGNOSIS — O99.320 MARIJUANA USE DURING PREGNANCY: Primary | ICD-10-CM

## 2025-05-16 RX ORDER — LABETALOL 200 MG/1
400 TABLET, FILM COATED ORAL 3 TIMES DAILY
COMMUNITY
Start: 2025-05-14

## 2025-05-23 ENCOUNTER — PROCEDURE VISIT (OUTPATIENT)
Dept: MATERNAL FETAL MEDICINE | Facility: CLINIC | Age: 30
End: 2025-05-23
Payer: MEDICAID

## 2025-05-23 ENCOUNTER — OFFICE VISIT (OUTPATIENT)
Dept: MATERNAL FETAL MEDICINE | Facility: CLINIC | Age: 30
End: 2025-05-23
Payer: MEDICAID

## 2025-05-23 VITALS
BODY MASS INDEX: 48.82 KG/M2 | WEIGHT: 293 LBS | SYSTOLIC BLOOD PRESSURE: 116 MMHG | DIASTOLIC BLOOD PRESSURE: 81 MMHG | HEART RATE: 81 BPM | HEIGHT: 65 IN

## 2025-05-23 DIAGNOSIS — O10.013 PRE-EXISTING ESSENTIAL HYPERTENSION AFFECTING PREGNANCY IN THIRD TRIMESTER: ICD-10-CM

## 2025-05-23 DIAGNOSIS — E66.01 SEVERE OBESITY DUE TO EXCESS CALORIES AFFECTING PREGNANCY IN THIRD TRIMESTER: ICD-10-CM

## 2025-05-23 DIAGNOSIS — D25.9 LEIOMYOMA OF UTERUS AFFECTING PREGNANCY IN THIRD TRIMESTER: ICD-10-CM

## 2025-05-23 DIAGNOSIS — O34.13 LEIOMYOMA OF UTERUS AFFECTING PREGNANCY IN THIRD TRIMESTER: ICD-10-CM

## 2025-05-23 DIAGNOSIS — O26.03 EXCESSIVE WEIGHT GAIN IN PREGNANCY IN THIRD TRIMESTER: ICD-10-CM

## 2025-05-23 DIAGNOSIS — O99.320 MARIJUANA USE DURING PREGNANCY: ICD-10-CM

## 2025-05-23 DIAGNOSIS — O41.03X0 OLIGOHYDRAMNIOS IN THIRD TRIMESTER, SINGLE OR UNSPECIFIED FETUS: ICD-10-CM

## 2025-05-23 DIAGNOSIS — O99.213 SEVERE OBESITY DUE TO EXCESS CALORIES AFFECTING PREGNANCY IN THIRD TRIMESTER: ICD-10-CM

## 2025-05-23 DIAGNOSIS — O99.320 MARIJUANA USE DURING PREGNANCY: Primary | ICD-10-CM

## 2025-05-23 DIAGNOSIS — F12.90 MARIJUANA USE DURING PREGNANCY: Primary | ICD-10-CM

## 2025-05-23 DIAGNOSIS — F12.90 MARIJUANA USE DURING PREGNANCY: ICD-10-CM

## 2025-05-23 NOTE — PROGRESS NOTES
Maternal Fetal Medicine Follow Up    Subjective:     Patient ID: [id number]    Chief Complaint: M follow up with US (ADALBERTO)      HPI: Denise Ulloa is a 29 y.o. female  at 37w0d gestation with Estimated Date of Delivery: 25  who is here for follow-up consultation by M.    She has chronic hypertension diagnosed early this pregnancy. She is on labetalol 400 mg q.8 hours.  Baseline labs on 2024:  Serum creatinine 0.61, 24 hour urine 203.7 mg protein.  She is on low-dose aspirin daily.  She had elevated BMI of 44 at initial OB visit.  On 2024, she had hemoglobin A1c 5.2%.  Her UDS on 11/15/24 returned positive of cannabinoids. She reported she stopped with discovery of pregnancy.  She has fibroid uterus.           Interval history since last MFM visit: None.. She denies any leaking fluid, vaginal bleeding, contractions, decreased fetal movement. Denies headaches, visual disturbances, or epigastric pain.    Pregnancy complications include:   Patient Active Problem List   Diagnosis    Increased BMI affecting pregnancy in third trimester    Pre-existing essential hypertension affecting pregnancy in third trimester    Marijuana use during pregnancy    Leiomyoma of uterus affecting pregnancy in third trimester    Excessive weight gain in pregnancy in third trimester       No changes to medical, surgical, family, social, or obstetric history.    Medications:  Current Outpatient Medications   Medication Instructions    aspirin (ECOTRIN) 81 mg, Oral, 2 times daily, Start after 12 weeks gestation.  At 35 weeks gestation, decrease to one 81mg tablet daily until delivery.    labetaloL (NORMODYNE) 300 mg, 3 times daily    labetaloL (NORMODYNE) 400 mg, 3 times daily    prenatal vit/iron fum/folic ac (PRENATAL 1+1 ORAL) Take by mouth.       Review of Systems   12 point review of systems conducted, negative except as stated in the history of present illness. See HPI for details.      Objective:     Visit  "Vitals  /81 (BP Location: Right arm, Patient Position: Sitting)   Pulse 81   Ht 5' 5" (1.651 m)   Wt (!) 137.4 kg (303 lb)   BMI 50.42 kg/m²         Physical Exam  Vitals and nursing note reviewed.   Constitutional:       Appearance: Normal appearance.      Comments: Increased BMI   HENT:      Head: Normocephalic and atraumatic.      Nose: Nose normal. No congestion.   Cardiovascular:      Rate and Rhythm: Normal rate.   Pulmonary:      Effort: Pulmonary effort is normal.   Skin:     Findings: No rash.   Neurological:      Mental Status: She is alert and oriented to person, place, and time.   Psychiatric:         Mood and Affect: Mood normal.         Behavior: Behavior normal.         Thought Content: Thought content normal.         Judgment: Judgment normal.         Assessment/Plan:     29 y.o.  female with IUP at 37w0d    Chronic hypertension  There was normal fetal growth with an EFW of 2454 g at the 24% and the AC at the 46% on 2025.  AFV is low normal with at 6.2 cm with 3.7 cm pocket.  Biophysical profile 8/8 on today, 2025       With low normal TUTU, advised patient to increase oral hydration and rest in lateral recumbent position. Reviewed instructions to do FKC 3/day with instructions to immediately report any decreased fetal movement.         Chronic hypertension is associated with significant adverse pregnancy outcomes including  birth, fetal growth restriction, fetal demise, placental abruption, and  delivery. Based on findings of recent CHAP Study, it is recommended to utilize 140/90 as the threshold for initiation or titration of medical therapy for chronic hypertension in pregnancy, rather than the previously recommended threshold of 160/110. For patients on blood pressure medications at the start of pregnancy, in the absence of mitigating factors or side effects, they can be maintained on their medications, rather than discontinuing them and waiting to initiate " treatment for blood pressures in the severe range.    Vitals:    25 0951 25 0953   BP: (!) 121/94 116/81     With this BP,  patient was advised to continue taking labetalol 600 mg q.8 hours.     Low dose aspirin as discussed.    Recommend twice weekly fetal testing until delivery. Fetal kick count instructions reviewed.      With chronic hypertension on medication and BMI over 45,  with the excessive weight gain currently BMI over 50 recommend delivery at or close to 38 weeks' gestation (37.5-38.5 weeks) as optimal balance between prematurity risks and risks of continued pregnancy.if evidence of superimposed preeclampsia, recommend immediate delivery.   Earlier delivery may be needed for worsening maternal or fetal status.  Discussed with Dr. tsai , and with the recent adjustment on dose of medication of labetalol, agree with the plan delivery in 4 days at 37-,1/2 weeks gestation.  Recommend close postpartum followup with Primary OB as well as PCP for ongoing evaluation/treatment of HTN.      Elevated BMI with previous excessive weight gain  Body mass index is 50.42 kg/m². With stable weight since last visit, she was advised to continue healthy, low-sodium diet avoiding any excessive weight gain. Excess weight gain would be associated with worsening hypertension, gestational diabetes and adverse  outcomes, including fetal demise in utero.    Fetal surveillance as above.    Preoperative antibiotics and thromboprophylaxis with use of pneumatic compression devices is recommend in those with very elevated BMI undergoing  delivery. Thromboprophylaxis should also be used with those on prolonged immobilization.    It is important to lose weight after the pregnancy is over, especially before a future pregnancy was discussed. Breastfeeding may be an important tool in reducing the postpartum weight retention. Fetal risks were discussed with short term risk of fetal/ obesity and long  term risk of adolescent component of metabolic syndrome.      Previous marijuana use  Previously discussed risks.  Encouraged continued abstinence throughout the pregnancy.  Consider intermittent drug screens throughout the pregnancy with risks/benefits of UDS discussed.        Leiomyoma in pregnancy  Risks of fibroids include  delivery,  PROM, abnormal presentations, higher risk of  sections, placenta previa, fibroid degeneration, poor fetal growth, oligohydramnios, vaginal bleeding and postpartum hemorrhage and in rare circumstances obstruction of lower uterus/cervix, impeding vaginal delivery    Reviewed instructions to report any increased pain, cramps, vaginal discharge or bleeding. Emphasized the importance of monitoring fetal kick count activity, and reporting immediately if decreased fetal movement occurs.    Document postpartum hemorrhage risk on admission to hospital, ensure T&S sent, and consider type and crossmatch depending on postpartum hemorrhage risk       Preeclampsia prophylaxis   She was advised to continue 81 mg aspirin daily until the end of pregnancy. Preeclampsia precautions  reviewed.       No follow-ups on file.     No future appointments.      Patient was evaluated and examined by Dr. Garner. JILLIAN Tyler, helped in pre charting of part of note.    This note was created with the assistance of [employer] voice recognition software. There may be transcription errors as a result of using this technology, however minimal. Effort has been made to ensure accuracy of transcription, but any obvious errors or omissions should be clarified with the author of the document.